# Patient Record
Sex: FEMALE | Race: WHITE
[De-identification: names, ages, dates, MRNs, and addresses within clinical notes are randomized per-mention and may not be internally consistent; named-entity substitution may affect disease eponyms.]

---

## 2017-01-26 ENCOUNTER — HOSPITAL ENCOUNTER (INPATIENT)
Dept: HOSPITAL 25 - ED | Age: 62
LOS: 4 days | Discharge: ANOTHER HOSPITAL | DRG: 720 | End: 2017-01-30
Attending: HOSPITALIST | Admitting: HOSPITALIST
Payer: COMMERCIAL

## 2017-01-26 DIAGNOSIS — Z79.899: ICD-10-CM

## 2017-01-26 DIAGNOSIS — D72.818: ICD-10-CM

## 2017-01-26 DIAGNOSIS — E87.6: ICD-10-CM

## 2017-01-26 DIAGNOSIS — Z94.4: ICD-10-CM

## 2017-01-26 DIAGNOSIS — K80.31: ICD-10-CM

## 2017-01-26 DIAGNOSIS — K74.3: ICD-10-CM

## 2017-01-26 DIAGNOSIS — A41.51: Primary | ICD-10-CM

## 2017-01-26 DIAGNOSIS — D69.59: ICD-10-CM

## 2017-01-26 LAB
ALBUMIN SERPL BCG-MCNC: 3.7 G/DL (ref 3.2–5.2)
ALP SERPL-CCNC: 93 U/L (ref 34–104)
ALT SERPL W P-5'-P-CCNC: 26 U/L (ref 7–52)
ANION GAP SERPL CALC-SCNC: 7 MMOL/L (ref 2–11)
AST SERPL-CCNC: 24 U/L (ref 13–39)
BUN SERPL-MCNC: 10 MG/DL (ref 6–24)
BUN/CREAT SERPL: 15.4 (ref 8–20)
CALCIUM SERPL-MCNC: 9.1 MG/DL (ref 8.6–10.3)
CHLORIDE SERPL-SCNC: 98 MMOL/L (ref 101–111)
GLOBULIN SER CALC-MCNC: 3 G/DL (ref 2–4)
GLUCOSE SERPL-MCNC: 127 MG/DL (ref 70–100)
HCO3 SERPL-SCNC: 25 MMOL/L (ref 22–32)
HCT VFR BLD AUTO: 37 % (ref 35–47)
HGB BLD-MCNC: 12.6 G/DL (ref 12–16)
LIPASE SERPL-CCNC: 19 U/L (ref 11–82)
MCH RBC QN AUTO: 30 PG (ref 27–31)
MCHC RBC AUTO-ENTMCNC: 34 G/DL (ref 31–36)
MCV RBC AUTO: 87 FL (ref 80–97)
POTASSIUM SERPL-SCNC: 3.5 MMOL/L (ref 3.5–5)
PROT SERPL-MCNC: 6.7 G/DL (ref 6.4–8.9)
RBC # BLD AUTO: 4.26 10^6/UL (ref 4–5.4)
SODIUM SERPL-SCNC: 130 MMOL/L (ref 133–145)
TROPONIN I SERPL-MCNC: 0.01 NG/ML (ref ?–0.04)
WBC # BLD AUTO: 4.8 10^3/UL (ref 3.5–10.8)

## 2017-01-26 PROCEDURE — 80053 COMPREHEN METABOLIC PANEL: CPT

## 2017-01-26 PROCEDURE — 81003 URINALYSIS AUTO W/O SCOPE: CPT

## 2017-01-26 PROCEDURE — 76376 3D RENDER W/INTRP POSTPROCES: CPT

## 2017-01-26 PROCEDURE — 85730 THROMBOPLASTIN TIME PARTIAL: CPT

## 2017-01-26 PROCEDURE — 86140 C-REACTIVE PROTEIN: CPT

## 2017-01-26 PROCEDURE — 87077 CULTURE AEROBIC IDENTIFY: CPT

## 2017-01-26 PROCEDURE — 87502 INFLUENZA DNA AMP PROBE: CPT

## 2017-01-26 PROCEDURE — 84484 ASSAY OF TROPONIN QUANT: CPT

## 2017-01-26 PROCEDURE — 85025 COMPLETE CBC W/AUTO DIFF WBC: CPT

## 2017-01-26 PROCEDURE — 87205 SMEAR GRAM STAIN: CPT

## 2017-01-26 PROCEDURE — 83605 ASSAY OF LACTIC ACID: CPT

## 2017-01-26 PROCEDURE — 36415 COLL VENOUS BLD VENIPUNCTURE: CPT

## 2017-01-26 PROCEDURE — 74181 MRI ABDOMEN W/O CONTRAST: CPT

## 2017-01-26 PROCEDURE — 74177 CT ABD & PELVIS W/CONTRAST: CPT

## 2017-01-26 PROCEDURE — 83690 ASSAY OF LIPASE: CPT

## 2017-01-26 PROCEDURE — 85610 PROTHROMBIN TIME: CPT

## 2017-01-26 PROCEDURE — 84145 PROCALCITONIN (PCT): CPT

## 2017-01-26 PROCEDURE — 71010: CPT

## 2017-01-26 PROCEDURE — 87040 BLOOD CULTURE FOR BACTERIA: CPT

## 2017-01-26 RX ADMIN — SODIUM CHLORIDE SCH MLS/HR: 900 IRRIGANT IRRIGATION at 14:43

## 2017-01-26 RX ADMIN — SODIUM CHLORIDE ONE MLS/HR: 900 IRRIGANT IRRIGATION at 11:00

## 2017-01-26 RX ADMIN — MYCOPHENOLIC ACID SCH MG: 360 TABLET, DELAYED RELEASE ORAL at 22:02

## 2017-01-26 RX ADMIN — ENOXAPARIN SODIUM SCH: 40 INJECTION SUBCUTANEOUS at 14:42

## 2017-01-26 RX ADMIN — TACROLIMUS SCH MG: 1 CAPSULE ORAL at 22:04

## 2017-01-26 RX ADMIN — SODIUM CHLORIDE ONE MLS/HR: 900 IRRIGANT IRRIGATION at 13:31

## 2017-01-26 NOTE — HP
ADMISSION HISTORY AND PHYSICAL:

 

DATE OF ADMISSION:  01/26/17

 

PRIMARY CARE PROVIDER:  Dr. Hank Bravo.

 

ADMITTING PROVIDER:  JENNIE Newton

 

SUPERVISING PHYSICIAN:  Dr. Elida Bowers. * (DICTATED BY JENNIE NEWTON)

 

CONSULTING INFECTIOUS DISEASE SPECIALIST:  Dr. Giorgi Phelps.

 

CHIEF COMPLAINT:  Fevers, dizziness, and weakness.

 

HISTORY OF PRESENT ILLNESS:  This is a 61-year-old female with a history of 
primary biliary cirrhosis, status post liver transplant approximately 6 years 
ago.  She is followed by a transplant specialist out of [place] and has 
really had no major complications since the transplant including major 
infections or hospitalizations.  The patient has been having intermittent 
fevers for the last week, first episode was approximately 1 week ago and was 
accompanied by some epigastric pain.  She said that her fever was as high as 
102 and she had severe chills and fatigue.  She was symptomatic for about 24 
hours and then her symptoms resolved spontaneously and she was asymptomatic for 
about 3 to 4 days.  She saw her transplant specialist for routine followup 
during that timeframe, who repeated routine labs and felt that she was doing 
well from a transplant perspective.  Then, again 2 days ago, she had recurrent 
symptoms, again fevers, chills, and she did have an episode of syncope 
associated with high fever.  The patient denies any associated nausea, vomiting
, or diarrhea.  She has not had any persistent abdominal pain.  No changes to 
her bowels.  She denies any cough or shortness of breath.  No rashes.  She 
believes she is urinating a bit more frequently than usual, but that is her 
only focal finding that she can think of.

 

The patient went to see her primary care provider yesterday with these 
complaints. She was febrile in his office with a temperature of about 101 
degrees Fahrenheit. Labs and blood cultures were taken at that time.  Blood 
cultures returned positive for gram-negative rods and the patient was contacted 
to proceed to the emergency department for evaluation.  The patient otherwise 
states that she is feeling quite well today and has been afebrile this morning.

 

PAST MEDICAL HISTORY:  Primary biliary cirrhosis, status post liver transplant 
approximately 6 years ago without significant complication.

 

PAST SURGICAL HISTORY:  Liver transplant.

 

HOME MEDICATIONS:

1.  Calcium carbonate supplementation 1 tablet p.o. twice daily.

2.  Mycophenolate 360 mg p.o. twice daily.

3.  Tacrolimus 2 mg p.o. twice daily.

4.  Ursodiol 500 mg in the morning and 250 mg in the evening.

 

SOCIAL HISTORY:  The patient lives at home with her .  No history of 
smoking or regular alcohol consumption.

 

REVIEW OF SYSTEMS:  As noted above in HPI.  She is actually feeling quite well 
at this point and her review of systems is unremarkable.

 

                               PHYSICAL EXAMINATION

 

GENERAL:  This is a very pleasant middle-aged female, in no acute distress.  
She is accompanied by her  and appears quite comfortable and smiles and 
jokes frequently.

 

VITAL SIGNS:  Temperature 97.8 degrees Fahrenheit, pulse 82 beats per minute, 
respiratory rate 20 per minute, oxygen saturation 100% on room air, and blood 
pressure 113/71 mmHg.

 

HEENT:  Head is normocephalic, atraumatic.  Mucous membranes are pink and moist.

 

RESPIRATORY:  Lungs are clear to auscultation without wheezes, crackles, or 
rhonchi.

 

CARDIOVASCULAR:  Heart has a regular rate and rhythm without murmurs, rubs, or 
gallops.

 

ABDOMEN:  Soft and nontender to palpation.

 

EXTREMITIES:  No lower extremity edema.

 

SKIN:  Limited exam shows no rashes or lesions.

 

DIAGNOSTIC STUDIES/LAB DATA:  CBC shows a white blood cell count of 4800 
yesterday; however, this was measured at 11,300; hemoglobin of 12.6 g/dL; a 
platelet count of 135,000 yesterday, this was normal at 159,000.  INR of 1.2.  
Comprehensive metabolic panel shows sodium of 130 mmol/L, which is similar to 
yesterday and a potassium of 3.5 mmol/L.  BUN of 10, creatinine 0.65.  Random 
glucose of 127 mg/dL.  Total bilirubin is 1.1, last lab available for reference 
from 2014 was 0.6. Transaminases are within normal limits including alk phos.  
Troponin negative.

 

Imaging:  Chest x-ray shows no acute process.

 

CT of the abdomen and pelvis is pending.

 

ASSESSMENT AND PLAN:  This is a 61-year-old female with history of primary 
biliary cirrhosis, status post liver transplant approximately 6 years ago, who 
presents with fevers and gram-negative bacteremia.

 

1.  Gram-negative bacteremia - the patient is asymptomatic at this time.  Final 
blood cultures are pending from yesterday and blood cultures have been repeated 
in the emergency department today.  The patient has no focal exam findings, 
only lab abnormality, has slightly elevated bilirubin.  Consider cholangitis or 
intermittent choledocholithiasis is potential etiology for her symptoms.  We 
plan to empirically treat with Zosyn.  CT scan of the abdomen and pelvis is 
pending at this time.  If negative, we will obtain ultrasound of the liver and 
consider MRCP depending on those results.  Dr. Giorgi Phelps has been asked 
to evaluate this patient and has seen her in the emergency department earlier 
today.  We plan to empirically treat with Zosyn at this time until the source 
of infection is more clear or final culture and sensitivities are available 
from blood cultures yesterday.

2.  History of primary biliary cirrhosis, status post liver transplant - we 
will continue per post-transplant medications at this time.

3.  Hyponatremia - it seemed that this might be due to hypovolemia and we will 
likely hydrate, but it appears that she is doing well at this time and is 
asymptomatic.

4.  Code status - the patient is a full code.

5.  DVT prophylaxis - the patient is at moderate risk for DVT and will be 
placed on _____ 40 mg subcu daily.

6.  Healthcare proxy is her .

 

DISPOSITION:  The patient is being admitted as an inpatient for gram-negative 
bacteremia with a history of liver transplant.  I anticipate length of stay to 
be greater than 2 midnights.

 

 ____________________________________ JENNIE NEWTON



CC:  Dr. Hank Bravo*

 

[id number]/[facility] #: [id number]

MAHENDRA

## 2017-01-26 NOTE — RAD
CLINICAL HISTORY: Abdominal pain, fever, history of liver transplant



COMPARISON: February 27, 2010



TECHNIQUE: Multiple contiguous axial CT scans were obtained of the abdomen and pelvis

after the administration of intravenous contrast. Coronal and sagittal multiplanar

reformations are submitted for review.  Oral contrast was administered.  Delayed images

were obtained through the abdomen



FINDINGS:    



LUNG BASES: The lung bases are clear.



LIVER: The liver is normal in shape, size, contour, and attenuation. There is postsurgical

change to the liver.

BILE DUCTS: There is intrahepatic and extrahepatic biliary dilatation. The common duct

measures up to 1.2 cm.

GALLBLADDER: The gallbladder is identified.



PANCREAS: The pancreas is normal, without mass or ductal dilatation.

SPLEEN: Normal in size and appearance.



UPPER GI TRACT: Evaluation of the gastrointestinal tract is limited by incomplete gastric

distention. The upper GI tract is unremarkable.

SMALL BOWEL AND MESENTERY: The small bowel is normal in contour, course, and caliber.

There is no obstruction or dilatation.

COLON: The colon is normal in contour, course, caliber. There is no pericolonic

inflammatory change.



ADRENALS: Normal bilaterally.

KIDNEYS: The kidneys are normal in shape, size, contour, and axis. There is no

hydronephrosis or nephrolithiasis.

BLADDER: The bladder is smooth in contour.



PELVIC ORGANS: The uterus is enlarged and lobulated consistent with a fibroid uterus.



AORTA: The aorta is normal.

IVC: Unremarkable



LYMPH NODES: There is no lymphadenopathy by size criteria.



ABDOMINAL WALL: There is a small fat-containing umbilical hernia.

BONES AND SOFT TISSUES: Chronic appearing compression deformities are noted of T12 and L3.

OTHER: None



IMPRESSION:

INTRAHEPATIC AND EXTRAHEPATIC BILIARY DILATATION.

FIBROID UTERUS

## 2017-01-26 NOTE — RAD
HISTORY: Fever, pneumonia



COMPARISONS: January 25, 2017



VIEWS:1: Single frontal portable view of the chest at 11:55 AM



FINDINGS:

LINES AND TUBES: None.

CARDIOMEDIASTINAL SILHOUETTE: The cardiomediastinal silhouette is normal for portable

technique.

PLEURA: The costophrenic angles are sharp. No pleural abnormalities are noted.

LUNG PARENCHYMA: The lungs are clear.

ABDOMEN: The upper abdomen is clear. There is no subphrenic gas.

BONES AND SOFT TISSUES: The patient is status post bilateral breast

augmentation/reconstruction



IMPRESSION: NO ACTIVE CARDIOPULMONARY DISEASE.

## 2017-01-26 NOTE — ED
Karel GAO Benjamin, scribed for Caesar Melo MD on 01/26/17 at 1119 .





Complex/Multi-Sys Presentation





- HPI Summary


HPI Summary: 





62yo female who has been having intermittent fever, shivering, and epigastric 

pain 1 week ago, and then again 2 days ago. Pt also fainted 2 days ago. Pt 

reports dull HA. Pt had fever yesterday as well but not today. Denies cough, or 

urinary symptoms. Pt has hx of liver transplant, which she follows up at 

[facility]. Pt had a recheck appointment last Friday. Pt 

also had blood culture drawn a few days ago that indicated positive for 

bacteria. 





- History Of Current Complaint


Chief Complaint: EDFever


Time Seen by Provider: 01/26/17 11:04


Hx Obtained From: Patient, Family/Caretaker - 


Onset/Duration: Gradual Onset, Lasting Days


Timing: Intermittent, Lasting:


Severity Currently: Moderate


Severity Initially: Moderate


Location: Pain At: - epigastric pain; HA


Associated Signs And Symptoms: Positive: Headache, Abdominal Pain - epigastric, 

Fever, Other - syncope





- Allergies/Home Medications


Allergies/Adverse Reactions: 


 Allergies











Allergy/AdvReac Type Severity Reaction Status Date / Time


 


No Known Allergies Allergy   Verified 03/30/13 12:59











Home Medications: 


 Home Medications





Calcium Carbonate-Cholecalcife [Calcium 1000 + D] 1 tab PO BID 01/26/17 [

History Confirmed 01/26/17]


Tacrolimus CAP(*) [Prograf CAP(*)] 2 mg PO BID 01/26/17 [History Confirmed 01/26 /17]


Ursodiol 250 mg PO QPM 01/26/17 [History Confirmed 01/26/17]


Ursodiol 500 mg PO QAM 01/26/17 [History Confirmed 01/26/17]











PMH/Surg Hx/FS Hx/Imm Hx


Respiratory History: 


   Denies: Hx Asthma, Hx Chronic Obstructive Pulmonary Disease (COPD)


Musculoskeletal History: 


   Denies: Hx Osteoporosis





- Surgical History


Surgery Procedure, Year, and Place: liver transplant


Infectious Disease History: No


Infectious Disease History: 


   Denies: Traveled Outside the [place] in Last 30 Days





- Family History


Known Family History: 


   Negative: Cardiac Disease, Hypertension, Diabetes





- Social History


Lives: With Family


Alcohol Use: None


Substance Use Type: Reports: None


Smoking Status (MU): Never Smoked Tobacco





Review of Systems


Positive: Fever


Eyes: Negative


ENT: Negative


Cardiovascular: Negative


Respiratory: Negative


Positive: Abdominal Pain - epigastric pain 


Genitourinary: Negative


Musculoskeletal: Negative


Skin: Negative


Positive: Headache, Syncope


Psychological: Normal


All Other Systems Reviewed And Are Negative: Yes





Physical Exam





- Summary


Physical Exam Summary: 








The patient is well-nourished in no acute distress and in no acute pain.





The skin is warm and dry and skin color reflects adequate perfusion. Decreased 

skin turgor. 





HEENT:  The head is normocephalic and atraumatic. The pupils are equal and 

reactive. The conjunctivae are clear and without drainage.  Nares are patent 

and without drainage.  Mouth reveals dry mucous membranes and the throat is 

without erythema and exudate.  The external ears are intact. The ear canals are 

patent and without drainage. The tympanic membranes are intact.





Neck is supple with full range of motion and non-tender. There are no carotid 

bruits.  There is no neck vein distension.





Respiratory: Chest is non-tender.  Lungs are clear to auscultation and breath 

sounds are symmetrical and equal.





Cardiovascular: Hear is regular rate and rhythm.  There is no murmur or rub 

auscultated.   There is no peripheral edema and pulses are symmetrical and 

equal.





Abdomen: The abdomen is soft and non-tender.  There are normal bowel sounds 

heard in all four quadrants and there is no organomegaly palpated.





Musculoskeletal: There is no back pain noted.  Extremities are non-tender with 

full range of motion.  There is good capillary refill.  There is no peripheral 

edema or calf tenderness elicited.





Neurological: Patient is alert and oriented to person, place and time.  The 

patient has symmetrical motor strength in all four extremities.  Cranial nerves 

are grossly intact. Deep tendon reflexes are symmetrical and equal in all four 

extremities.





Psychiatric: The patient has an appropriate affect and does not exhibit any 

anxiety or depression. 


Triage Information Reviewed: Yes


Vital Signs On Initial Exam: 


 Initial Vitals











Temp Pulse Resp BP Pulse Ox


 


 97.8 F   82   20   113/71   100 


 


 01/26/17 10:45  01/26/17 10:45  01/26/17 10:45  01/26/17 10:45  01/26/17 10:45











Vital Signs Reviewed: Yes





Diagnostics





- Vital Signs


 Vital Signs











  Temp Pulse Resp BP Pulse Ox


 


 01/26/17 10:45  97.8 F  82  20  113/71  100














- Laboratory


Lab Results: 


 Lab Results











  01/26/17 01/26/17 01/26/17 Range/Units





  11:20 11:20 11:20 


 


WBC  4.8    (3.5-10.8)  10^3/ul


 


RBC  4.26    (4.0-5.4)  10^6/ul


 


Hgb  12.6    (12.0-16.0)  g/dl


 


Hct  37    (35-47)  %


 


MCV  87    (80-97)  fL


 


MCH  30    (27-31)  pg


 


MCHC  34    (31-36)  g/dl


 


RDW  13    (10.5-15)  %


 


Plt Count  135 L    (150-450)  10^3/ul


 


MPV  8    (7.4-10.4)  um3


 


Neut % (Auto)  73.6    (38-83)  %


 


Lymph % (Auto)  17.2 L    (25-47)  %


 


Mono % (Auto)  8.9    (1-9)  %


 


Eos % (Auto)  0.2    (0-6)  %


 


Baso % (Auto)  0.1    (0-2)  %


 


Absolute Neuts (auto)  3.5    (1.5-7.7)  10^3/ul


 


Absolute Lymphs (auto)  0.8 L    (1.0-4.8)  10^3/ul


 


Absolute Monos (auto)  0.4    (0-0.8)  10^3/ul


 


Absolute Eos (auto)  0    (0-0.6)  10^3/ul


 


Absolute Basos (auto)  0    (0-0.2)  10^3/ul


 


Absolute Nucleated RBC  0    10^3/ul


 


Nucleated RBC %  0    


 


INR (Anticoag Therapy)   1.20 H   (0.89-1.11)  


 


APTT   27.4   (26.0-36.3)  seconds


 


Sodium    130 L  (133-145)  mmol/L


 


Potassium    3.5  (3.5-5.0)  mmol/L


 


Chloride    98 L  (101-111)  mmol/L


 


Carbon Dioxide    25  (22-32)  mmol/L


 


Anion Gap    7  (2-11)  mmol/L


 


BUN    10  (6-24)  mg/dL


 


Creatinine    0.65  (0.51-0.95)  mg/dL


 


Est GFR ( Amer)    119.6  (>60)  


 


Est GFR (Non-Af Amer)    93.0  (>60)  


 


BUN/Creatinine Ratio    15.4  (8-20)  


 


Glucose    127 H  ()  mg/dL


 


Lactic Acid     (0.5-2.0)  mmol/L


 


Calcium    9.1  (8.6-10.3)  mg/dL


 


Total Bilirubin    1.10 H  (0.2-1.0)  mg/dL


 


AST    24  (13-39)  U/L


 


ALT    26  (7-52)  U/L


 


Alkaline Phosphatase    93  ()  U/L


 


Troponin I    0.01  (<0.04)  ng/mL


 


C-Reactive Protein    99.78 H  (< 5.00)  mg/L


 


Total Protein    6.7  (6.4-8.9)  g/dL


 


Albumin    3.7  (3.2-5.2)  g/dL


 


Globulin    3.0  (2-4)  g/dL


 


Albumin/Globulin Ratio    1.2  (1-3)  


 


Lipase    19  (11.0-82.0)  U/L


 


Procalcitonin     (<0.6)  ng/mL














  01/26/17 01/26/17 Range/Units





  11:20 11:20 


 


WBC    (3.5-10.8)  10^3/ul


 


RBC    (4.0-5.4)  10^6/ul


 


Hgb    (12.0-16.0)  g/dl


 


Hct    (35-47)  %


 


MCV    (80-97)  fL


 


MCH    (27-31)  pg


 


MCHC    (31-36)  g/dl


 


RDW    (10.5-15)  %


 


Plt Count    (150-450)  10^3/ul


 


MPV    (7.4-10.4)  um3


 


Neut % (Auto)    (38-83)  %


 


Lymph % (Auto)    (25-47)  %


 


Mono % (Auto)    (1-9)  %


 


Eos % (Auto)    (0-6)  %


 


Baso % (Auto)    (0-2)  %


 


Absolute Neuts (auto)    (1.5-7.7)  10^3/ul


 


Absolute Lymphs (auto)    (1.0-4.8)  10^3/ul


 


Absolute Monos (auto)    (0-0.8)  10^3/ul


 


Absolute Eos (auto)    (0-0.6)  10^3/ul


 


Absolute Basos (auto)    (0-0.2)  10^3/ul


 


Absolute Nucleated RBC    10^3/ul


 


Nucleated RBC %    


 


INR (Anticoag Therapy)    (0.89-1.11)  


 


APTT    (26.0-36.3)  seconds


 


Sodium    (133-145)  mmol/L


 


Potassium    (3.5-5.0)  mmol/L


 


Chloride    (101-111)  mmol/L


 


Carbon Dioxide    (22-32)  mmol/L


 


Anion Gap    (2-11)  mmol/L


 


BUN    (6-24)  mg/dL


 


Creatinine    (0.51-0.95)  mg/dL


 


Est GFR ( Amer)    (>60)  


 


Est GFR (Non-Af Amer)    (>60)  


 


BUN/Creatinine Ratio    (8-20)  


 


Glucose    ()  mg/dL


 


Lactic Acid  0.8   (0.5-2.0)  mmol/L


 


Calcium    (8.6-10.3)  mg/dL


 


Total Bilirubin    (0.2-1.0)  mg/dL


 


AST    (13-39)  U/L


 


ALT    (7-52)  U/L


 


Alkaline Phosphatase    ()  U/L


 


Troponin I    (<0.04)  ng/mL


 


C-Reactive Protein    (< 5.00)  mg/L


 


Total Protein    (6.4-8.9)  g/dL


 


Albumin    (3.2-5.2)  g/dL


 


Globulin    (2-4)  g/dL


 


Albumin/Globulin Ratio    (1-3)  


 


Lipase    (11.0-82.0)  U/L


 


Procalcitonin   1.4 H  (<0.6)  ng/mL











Result Diagrams: 


 01/26/17 11:20





 01/26/17 11:20


Lab Statement: Any lab studies that have been ordered have been reviewed, and 

results considered in the medical decision making process.





- Radiology


  ** CXR


Xray Interpretation: No Acute Changes


Radiology Interpretation Completed By: Radiologist





- CT


  ** A/P W


CT Interpretation: No Acute Changes


CT Interpretation Completed By: Radiologist





Complex Multi-Symp Course/Dx


Course Of Treatment: Spoke to Dr. Rocha. Decided to admit the pt, and start 

her on Zosyn. Will put additional orders of CT A/P W and flu swab. Will speak 

to Dr. Bowers for admission.





- Diagnoses


Differential Diagnoses/HQI/PQRI: Sepsis, Urinary Tract Infection, Other - gram 

negative sepsis, liver abscess, influenza


Provider Diagnoses: 


 Bacteremia due to Gram-negative bacteria








- Physician Notifications


Discussed Care Of Patient With: Dr. Rocha @11:30.  Dr. Bowers @11:35.





Discharge





- Discharge Plan


Condition: Stable


Disposition: ADMITTED TO [facility]





The documentation as recorded by the Karel bazan Benjamin accurately reflects 

the service I personally performed and the decisions made by me, Caesar Melo MD.

## 2017-01-26 NOTE — CONS
CONSULTATION REPORT:

 

DATE OF CONSULT:  01/26/17

 

REQUESTING PHYSICIAN:  Dr. Melo.

 

CONSULTING SERVICE:  Infectious Disease.

 

REASON FOR CONSULT:  Fever, rigors.

 

IMPRESSION:

1.  Intermittent fever, rigors, and epigastric pain in a woman with a liver 
transplant 6 years ago.  She has crampy epigastric pain that comes on after 
eating and a gram-negative grace in the blood associated with fever and rigors.  
She has a slight elevation in bilirubin, concern for biliary tract abnormality 
including stenosis, partial obstruction or intermittent obstruction.  She has 
no other abdominal symptoms and no urinary symptoms including no dysuria or 
frequency that would suggest an alternative source for her gram-negative grace 
bacteremia.

2.  Status post liver transplant 6 years ago for primary biliary cirrhosis, on 
tacrolimus and mycophenolate.

3.  Leukocytosis.

4.  Thrombocytopenia.

 

RECOMMENDATION:  Zosyn 3.375 g IV every 8 hours by extended infusion.  Recheck 
blood cultures.  Check a CT of the abdomen and pelvis for biliary tract 
abnormality and to rule out liver abscess.  If they are negative or unrevealing 
of an underlying etiology, the next step would be an MRCP.

 

HISTORY OF PRESENT ILLNESS:  This is a 61-year-old woman with a history of 
liver transplant, admitted with fever, rigors, and abdominal pain.  She had 
been well until last week when she developed a sudden onset of epigastric and 
right upper quadrant crampy abdominal pain after eating dinner.  She does not 
remember what she had for dinner at that point.  Later that night, she had fever
, chills, rigors, and sweats.  By the next morning, she felt pretty much back 
to her usual self, and then it happened again 2 days ago after dinner.  She has 
had a decreased appetite over the last few days, however.  In any event, after 
dinner 2 nights ago, she had rigors and chills and had blood cultures ordered 
by Dr. Reid.  She was started on Augmentin.  She tolerated it well.  She 
is feeling a little bit better.  The blood cultures came back positive with gram
-negative rods.  So, she was referred to the ER where she is seen today.  Her 
white blood cell count was 11,000 on the 25th when checked by Dr. Reid and 
her bilirubin was 1.1, the LFTs were normal, CRP was 100.  This morning, she 
has no abdominal pain or fevers.  She otherwise feels back to her usual self.  
She has had no sick contacts, no myalgia except for during episodes of fever.  
She has tried some Tylenol, which helped.  Nothing seems to make the pain worse 
except for those that come on only after dinner.

 

PAST MEDICAL HISTORY:

1.  Status post liver transplant 6 years ago in [facility].

2.  Primary biliary cirrhosis.

 

MEDICATIONS:

1.  Ursodiol.

2.  Mycophenolate.

3.  Tacrolimus.

 

ALLERGIES:  No known drug allergies.

 

FAMILY HISTORY:  No recurrent infections.

 

SOCIAL HISTORY:  She lives in [place].  She is originally from [place].  She 
works in [employer].  She has no sick contacts.

 

REVIEW OF SYSTEMS:  All negative except as noted above.

 

PHYSICAL EXAM:  Vital Signs:  Temperature 36.6, heart rate 77, respiratory rate 
18, blood pressure 107/66, O2 sat 100% on room air.  In general, she is awake 
and in no acute distress.  Neurologically, she is oriented x3, follows all 
commands.  HEENT: Pupils are equal, round, and reactive to light without 
conjunctival hemorrhage. Oropharynx without lesions.  Neck:  Neck is supple 
without nuchal rigidity.  Lymph Nodes:  There is no cervical, supraclavicular, 
inguinal, axillary, or epitrochlear lymphadenopathy.  Heart has regular rate 
and rhythm without murmurs, rubs, or gallops.  Lungs are clear to auscultation 
bilaterally.  Abdomen:  Soft, nontender, nondistended without 
hepatosplenomegaly.  There is no right upper quadrant tenderness or Espinoza's 
sign.  Skin:  There is no rash or splinter hemorrhages. Musculoskeletal:  There 
is no spine tenderness to palpation or joint synovitis.

 

LABORATORY DATA:  Creatinine is 0.6.  Bilirubin 1.1, AST is 24, ALT 26, 
alkaline phosphatase 93.  CRP 99.  White blood cell count 11, platelets 135.

 

Please see impressions and recommendations as outlined above, which I have been 
discussed with Dr. Melo, Dr. Bowers, and JENNIE Lorenzo.

 

Thanks for asking me to see Ms. Engel in consultation.

 

 [id number]/[facility] #: [id number]

MAHENDRA

## 2017-01-27 LAB
ALBUMIN SERPL BCG-MCNC: 3.1 G/DL (ref 3.2–5.2)
ALP SERPL-CCNC: 72 U/L (ref 34–104)
ALT SERPL W P-5'-P-CCNC: 19 U/L (ref 7–52)
ANION GAP SERPL CALC-SCNC: 7 MMOL/L (ref 2–11)
AST SERPL-CCNC: 16 U/L (ref 13–39)
BUN SERPL-MCNC: 7 MG/DL (ref 6–24)
BUN/CREAT SERPL: 13 (ref 8–20)
CALCIUM SERPL-MCNC: 8 MG/DL (ref 8.6–10.3)
CHLORIDE SERPL-SCNC: 104 MMOL/L (ref 101–111)
GLOBULIN SER CALC-MCNC: 2.5 G/DL (ref 2–4)
GLUCOSE SERPL-MCNC: 94 MG/DL (ref 70–100)
HCO3 SERPL-SCNC: 23 MMOL/L (ref 22–32)
HCT VFR BLD AUTO: 32 % (ref 35–47)
HGB BLD-MCNC: 10.8 G/DL (ref 12–16)
MCH RBC QN AUTO: 30 PG (ref 27–31)
MCHC RBC AUTO-ENTMCNC: 34 G/DL (ref 31–36)
MCV RBC AUTO: 87 FL (ref 80–97)
POTASSIUM SERPL-SCNC: 3.4 MMOL/L (ref 3.5–5)
PROT SERPL-MCNC: 5.6 G/DL (ref 6.4–8.9)
RBC # BLD AUTO: 3.65 10^6/UL (ref 4–5.4)
SODIUM SERPL-SCNC: 134 MMOL/L (ref 133–145)
WBC # BLD AUTO: 3.6 10^3/UL (ref 3.5–10.8)

## 2017-01-27 RX ADMIN — ENOXAPARIN SODIUM SCH: 40 INJECTION SUBCUTANEOUS at 12:07

## 2017-01-27 RX ADMIN — SODIUM CHLORIDE SCH MLS/HR: 900 IRRIGANT IRRIGATION at 20:08

## 2017-01-27 RX ADMIN — SODIUM CHLORIDE SCH MLS/HR: 900 IRRIGANT IRRIGATION at 17:56

## 2017-01-27 RX ADMIN — MYCOPHENOLIC ACID SCH MG: 360 TABLET, DELAYED RELEASE ORAL at 20:06

## 2017-01-27 RX ADMIN — MYCOPHENOLIC ACID SCH MG: 360 TABLET, DELAYED RELEASE ORAL at 08:24

## 2017-01-27 RX ADMIN — URSODIOL SCH MG: 250 TABLET, FILM COATED ORAL at 17:55

## 2017-01-27 RX ADMIN — SODIUM CHLORIDE SCH MLS/HR: 900 IRRIGANT IRRIGATION at 07:01

## 2017-01-27 RX ADMIN — TACROLIMUS SCH MG: 1 CAPSULE ORAL at 20:06

## 2017-01-27 RX ADMIN — TACROLIMUS SCH MG: 1 CAPSULE ORAL at 08:24

## 2017-01-27 NOTE — PN
Progress Note





- Progress Note


SOAP: 


Subjective:


DOS: 1/27/17


CC: fever


HPI: 61 year old woman with liver transplant with intermittent fever and rigors 

after dinner 2-3 times over a week.  Outpt BC GNR, sent to ER, admitted 1/27/ 17.  CT AP showed intra and extrahepatic biliary duct dilation.  MRCP shows CBD 

filling defects.  No fever or abd pain overnight.  No rash or diarrhea.  Wants 

to leave.








Objective:


[]


 Vital Signs











Temp  36.6 C   01/27/17 11:19


 


Pulse  68   01/27/17 11:19


 


Resp  16   01/27/17 11:19


 


BP  110/58   01/27/17 11:19


 


Pulse Ox  100   01/27/17 11:19








 Intake & Output











 01/26/17 01/27/17 01/27/17





 18:59 06:59 18:59


 


Intake Total 1100 0 880


 


Balance 1100 0 880


 


Weight 119 lb 3.2 oz 119 lb 3.2 oz 


 


Intake:   


 


  IV Fluids 1100  


 


  Oral  0 880


 


Other:   


 


  Estimated Void   Medium


 


  # Bowel Movements   0


 


  # Voids  2 2








Gen:NAD, not diaphoretic


Neuro:AAOx3, answers all questions


Neck:Supple


Heart:RRR no murmur


Lungs:CTA BL


Abd:+BS NTND soft


Skin: no rash


LN: no visible or palpable LN


 











  01/26/17 01/26/17 01/26/17





  11:20 11:20 11:20


 


WBC  4.8  


 


RBC  4.26  


 


Hgb  12.6  


 


Hct  37  


 


MCV  87  


 


MCH  30  


 


MCHC  34  


 


RDW  13  


 


Plt Count  135 L  


 


MPV  8  


 


Neut % (Auto)  73.6  


 


Lymph % (Auto)  17.2 L  


 


Mono % (Auto)  8.9  


 


Eos % (Auto)  0.2  


 


Baso % (Auto)  0.1  


 


Absolute Neuts (auto)  3.5  


 


Absolute Lymphs (auto)  0.8 L  


 


Absolute Monos (auto)  0.4  


 


Absolute Eos (auto)  0  


 


Absolute Basos (auto)  0  


 


Absolute Nucleated RBC  0  


 


Nucleated RBC %  0  


 


INR (Anticoag Therapy)   1.20 H 


 


APTT   27.4 


 


Sodium    130 L


 


Potassium    3.5


 


Chloride    98 L


 


Carbon Dioxide    25


 


Anion Gap    7


 


BUN    10


 


Creatinine    0.65


 


Est GFR ( Amer)    119.6


 


Est GFR (Non-Af Amer)    93.0


 


BUN/Creatinine Ratio    15.4


 


Glucose    127 H


 


Lactic Acid   


 


Calcium    9.1


 


Total Bilirubin    1.10 H


 


AST    24


 


ALT    26


 


Alkaline Phosphatase    93


 


Troponin I    0.01


 


C-Reactive Protein    99.78 H


 


Total Protein    6.7


 


Albumin    3.7


 


Globulin    3.0


 


Albumin/Globulin Ratio    1.2


 


Lipase    19


 


Procalcitonin   


 


Urine Color   


 


Urine Appearance   


 


Urine pH   


 


Ur Specific Gravity   


 


Urine Protein   


 


Urine Ketones   


 


Urine Blood   


 


Urine Nitrate   


 


Urine Bilirubin   


 


Urine Urobilinogen   


 


Ur Leukocyte Esterase   


 


Urine Glucose   


 


Influenza A (Rapid)   


 


Influenza B (Rapid)   














  01/26/17 01/26/17 01/26/17





  11:20 11:20 12:55


 


WBC   


 


RBC   


 


Hgb   


 


Hct   


 


MCV   


 


MCH   


 


MCHC   


 


RDW   


 


Plt Count   


 


MPV   


 


Neut % (Auto)   


 


Lymph % (Auto)   


 


Mono % (Auto)   


 


Eos % (Auto)   


 


Baso % (Auto)   


 


Absolute Neuts (auto)   


 


Absolute Lymphs (auto)   


 


Absolute Monos (auto)   


 


Absolute Eos (auto)   


 


Absolute Basos (auto)   


 


Absolute Nucleated RBC   


 


Nucleated RBC %   


 


INR (Anticoag Therapy)   


 


APTT   


 


Sodium   


 


Potassium   


 


Chloride   


 


Carbon Dioxide   


 


Anion Gap   


 


BUN   


 


Creatinine   


 


Est GFR ( Amer)   


 


Est GFR (Non-Af Amer)   


 


BUN/Creatinine Ratio   


 


Glucose   


 


Lactic Acid  0.8  


 


Calcium   


 


Total Bilirubin   


 


AST   


 


ALT   


 


Alkaline Phosphatase   


 


Troponin I   


 


C-Reactive Protein   


 


Total Protein   


 


Albumin   


 


Globulin   


 


Albumin/Globulin Ratio   


 


Lipase   


 


Procalcitonin   1.4 H 


 


Urine Color   


 


Urine Appearance   


 


Urine pH   


 


Ur Specific Gravity   


 


Urine Protein   


 


Urine Ketones   


 


Urine Blood   


 


Urine Nitrate   


 


Urine Bilirubin   


 


Urine Urobilinogen   


 


Ur Leukocyte Esterase   


 


Urine Glucose   


 


Influenza A (Rapid)    Negative


 


Influenza B (Rapid)    Negative














  01/26/17 01/26/17 01/27/17





  13:12 15:06 05:29


 


WBC    3.6


 


RBC    3.65 L


 


Hgb    10.8 L


 


Hct    32 L


 


MCV    87


 


MCH    30


 


MCHC    34


 


RDW    13


 


Plt Count    114 L


 


MPV    8


 


Neut % (Auto)    63.7


 


Lymph % (Auto)    24.3 L


 


Mono % (Auto)    10.7 H


 


Eos % (Auto)    0.7


 


Baso % (Auto)    0.6


 


Absolute Neuts (auto)    2.3


 


Absolute Lymphs (auto)    0.9 L


 


Absolute Monos (auto)    0.4


 


Absolute Eos (auto)    0


 


Absolute Basos (auto)    0


 


Absolute Nucleated RBC    0


 


Nucleated RBC %    0.1


 


INR (Anticoag Therapy)   


 


APTT   


 


Sodium   


 


Potassium   


 


Chloride   


 


Carbon Dioxide   


 


Anion Gap   


 


BUN   


 


Creatinine   


 


Est GFR ( Amer)   


 


Est GFR (Non-Af Amer)   


 


BUN/Creatinine Ratio   


 


Glucose   


 


Lactic Acid   0.5 


 


Calcium   


 


Total Bilirubin   


 


AST   


 


ALT   


 


Alkaline Phosphatase   


 


Troponin I   


 


C-Reactive Protein   


 


Total Protein   


 


Albumin   


 


Globulin   


 


Albumin/Globulin Ratio   


 


Lipase   


 


Procalcitonin   


 


Urine Color  Yellow  


 


Urine Appearance  Clear  


 


Urine pH  5.0  


 


Ur Specific Gravity  1.025  


 


Urine Protein  Negative  


 


Urine Ketones  Trace H  


 


Urine Blood  Negative  


 


Urine Nitrate  Negative  


 


Urine Bilirubin  Negative  


 


Urine Urobilinogen  Negative  


 


Ur Leukocyte Esterase  Negative  


 


Urine Glucose  Negative  


 


Influenza A (Rapid)   


 


Influenza B (Rapid)   














  01/27/17





  05:29


 


WBC 


 


RBC 


 


Hgb 


 


Hct 


 


MCV 


 


MCH 


 


MCHC 


 


RDW 


 


Plt Count 


 


MPV 


 


Neut % (Auto) 


 


Lymph % (Auto) 


 


Mono % (Auto) 


 


Eos % (Auto) 


 


Baso % (Auto) 


 


Absolute Neuts (auto) 


 


Absolute Lymphs (auto) 


 


Absolute Monos (auto) 


 


Absolute Eos (auto) 


 


Absolute Basos (auto) 


 


Absolute Nucleated RBC 


 


Nucleated RBC % 


 


INR (Anticoag Therapy) 


 


APTT 


 


Sodium  134


 


Potassium  3.4 L


 


Chloride  104


 


Carbon Dioxide  23


 


Anion Gap  7


 


BUN  7


 


Creatinine  0.54


 


Est GFR ( Amer)  148.1


 


Est GFR (Non-Af Amer)  115.2


 


BUN/Creatinine Ratio  13.0


 


Glucose  94


 


Lactic Acid 


 


Calcium  8.0 L


 


Total Bilirubin  0.70


 


AST  16


 


ALT  19


 


Alkaline Phosphatase  72


 


Troponin I 


 


C-Reactive Protein 


 


Total Protein  5.6 L


 


Albumin  3.1 L


 


Globulin  2.5


 


Albumin/Globulin Ratio  1.2


 


Lipase 


 


Procalcitonin 


 


Urine Color 


 


Urine Appearance 


 


Urine pH 


 


Ur Specific Gravity 


 


Urine Protein 


 


Urine Ketones 


 


Urine Blood 


 


Urine Nitrate 


 


Urine Bilirubin 


 


Urine Urobilinogen 


 


Ur Leukocyte Esterase 


 


Urine Glucose 


 


Influenza A (Rapid) 


 


Influenza B (Rapid) 








Microbiology





01/26/17 12:05   Blood Venous   Aerobic Blood Culture - Final


01/26/17 12:05   Blood Venous   Blood Culture - Final


                              Escherichia Coli


                              Escherichia Coli


01/25/17 14:58   Blood Venous   Aerobic Blood Culture - Final


01/25/17 14:58   Blood Venous   Blood Culture - Final


                              Escherichia Coli


                              Escherichia Coli











Assessment:


1. E.coli bacteremia and sx intermittent biliary colic, likely CBD stone on 

MRCP.  Bacteremia persists. 


2. Liver transplant on tacro and cellcept


3. thrombocytopenia








Plan:


1. change abx to 1 gm IV Q24hrs, awaiting decision from her transplant Drs 

regarding next steps to get an ERCP done.  High risk for bad outcome given 

organ transplant and immunosuppressive medications, I told the patient she 

needs to stay on IV antibiotics while the transfer process is happening.  


Discussed with Dr Gamez and Dr Vasquez

## 2017-01-27 NOTE — RAD
Indication: Common duct calculi.  Patient has history of liver transplant.



Image Sequences:  Coronal T2, heavily T2-weighted coronal, axial T2 fat sat images were

obtained. Multiple maximum intensity projection images were obtained.



The patient reports a liver transplant. It is presumed to be an orthotopic liver

transplant as there appears to be a left and right portal vein noted. There are dilated

intrahepatic ducts noted. There is a web or stricture in the common bile duct which is

presumably at the site of the anastomosis between the transplanted liver's common bile

duct and the native common bile duct. Correlation with surgical history is suggested. No

old films are available for comparison. There are, however, tiny filling defects just

proximal to the dilated common duct proximal to the stricture which may represent sludge

or small common duct stones. There are no filling defects noted in the distal duct

suspicious for distal common duct stone.



IMPRESSION: There is presumed orthotopic liver transplant. There is a stricture in the

common bile duct which is presumed to be at the anastomosis between common duct of the

transplanted liver and the native common bile duct. There are filling defects proximal to

the stricture. These are likely common duct stones.

## 2017-01-27 NOTE — PN
Subjective


Date of Service: 01/27/17


Interval History: 


HOSPITALIST PROGRESS NOTE


Patient seen and examined at bedside.


She feels better today. No further episodes of fever or abdominal pain.


Family History: Unchanged from Admission


Social History: Unchanged from Admission


Past Medical History: Unchanged from Admission





Objective


Active Medications: 








Acetaminophen (Tylenol Tab*)  650 mg PO Q4H PRN


   PRN Reason: FEVER/PAIN


Enoxaparin Sodium (Lovenox(*))  40 mg SUBCUT Q24H [facility]


   Last Admin: 01/27/17 12:07 Dose:  Not Given


Piperacillin Sod/Tazobactam Sod (Zosyn 3.375 Gm In Ns Premix*)  3.375 gm in 100 

mls @ 25 mls/hr IVPB Q8H [facility]


   Stop: 01/28/17 06:00


   Last Admin: 01/27/17 16:40 Dose:  25 mls/hr


Ceftriaxone Sodium 1,000 mg/ (Sodium Chloride)  50 mls @ 200 mls/hr IVPB Q24H 

[facility]


Sodium Chloride (Ns 0.9% 1000 Ml*)  1,000 mls @ 100 mls/hr IV PER RATE [facility]


Iohexol (Omnipaque 300* (Contrast))  69 ml IV ONCE [facility]


   Stop: 01/28/17 23:59


   Last Admin: 01/26/17 13:52 Dose:  69 ml


Morphine Sulfate (Morphine Inj (Syringe)*)  2 mg IV Q4H PRN


   PRN Reason: PAIN


Mycophenolate Sodium (Myfortic (Nf))  360 mg PO BID [facility]


   Last Admin: 01/27/17 08:24 Dose:  360 mg


Tacrolimus (Prograf Cap(*))  2 mg PO BID [facility]


Ursodiol (Yonathan 250(Nf))  250 mg PO QPM [facility]


Ursodiol (Yonathan 250(Nf))  500 mg PO QAM [facility]








 Vital Signs











  01/27/17 01/27/17 01/27/17





  07:40 08:00 11:19


 


Temperature 97.2 F  97.8 F


 


Pulse Rate 72  68


 


Respiratory 16 16 16





Rate   


 


Blood Pressure 123/67  110/58





(mmHg)   


 


O2 Sat by Pulse 98 100 100





Oximetry   











Oxygen Devices in Use Now: None


Appearance: Pleasant lady sitting up in bed in NAD.


Eyes: No Scleral Icterus


Ears/Nose/Mouth/Throat: Mucous Membranes Moist


Neck: Trachea Midline


Respiratory: Symmetrical Chest Expansion and Respiratory Effort, Clear to 

Auscultation


Cardiovascular: NL Sounds; No Murmurs; No JVD, RRR


Abdominal: NL Sounds; No Tenderness; No Distention


Extremities: No Edema


Neurological: Alert and Oriented x 3, NL Muscle Strength and Tone


Lines/Tubes/Other Access: Clean, Dry and Intact Peripheral IV


Nutrition: Taking PO's


Result Diagrams: 


 01/27/17 05:29





 01/27/17 05:29





Assess/Plan/Problems-Billing


Assessment: 


Mrs. Engel is a 60yo F with PMH of PBC s/p liver transplant who presented 

with E. coli septicemia secondary to cholangitis.





- Patient Problems


(1) E. coli septicemia


Comment: - Source is likely cholangitis.


- Blood cultures still positive.


- ID input appreciated.


- Continue IVF and Ceftriaxone.   





(2) Cholangitis


Comment: - MRCP reviewed - shows CBD stenosis and gallstones. Likely source of 

her infection.


- [facility] patient accepted 

in transfer for further evaluation, possible ERCP and stent placement.


- Awaiting bed availability.   





(3) DVT prophylaxis


Comment: - Lovenox.

## 2017-01-28 LAB
ADD DIFF/SLIDE REVIEW?: (no result)
ALBUMIN SERPL BCG-MCNC: 3.2 G/DL (ref 3.2–5.2)
ALP SERPL-CCNC: 71 U/L (ref 34–104)
ALT SERPL W P-5'-P-CCNC: 17 U/L (ref 7–52)
ANION GAP SERPL CALC-SCNC: 7 MMOL/L (ref 2–11)
AST SERPL-CCNC: 16 U/L (ref 13–39)
BUN SERPL-MCNC: 5 MG/DL (ref 6–24)
BUN/CREAT SERPL: 10.9 (ref 8–20)
CALCIUM SERPL-MCNC: 8.4 MG/DL (ref 8.6–10.3)
CHLORIDE SERPL-SCNC: 106 MMOL/L (ref 101–111)
GLOBULIN SER CALC-MCNC: 2.7 G/DL (ref 2–4)
GLUCOSE SERPL-MCNC: 92 MG/DL (ref 70–100)
HCO3 SERPL-SCNC: 21 MMOL/L (ref 22–32)
HCT VFR BLD AUTO: 33 % (ref 35–47)
HGB BLD-MCNC: 11.1 G/DL (ref 12–16)
MCH RBC QN AUTO: 30 PG (ref 27–31)
MCHC RBC AUTO-ENTMCNC: 34 G/DL (ref 31–36)
MCV RBC AUTO: 87 FL (ref 80–97)
POTASSIUM SERPL-SCNC: 3.8 MMOL/L (ref 3.5–5)
PROT SERPL-MCNC: 5.9 G/DL (ref 6.4–8.9)
RBC # BLD AUTO: 3.76 10^6/UL (ref 4–5.4)
SODIUM SERPL-SCNC: 134 MMOL/L (ref 133–145)
WBC # BLD AUTO: 2.8 10^3/UL (ref 3.5–10.8)

## 2017-01-28 RX ADMIN — ENOXAPARIN SODIUM SCH: 40 INJECTION SUBCUTANEOUS at 13:31

## 2017-01-28 RX ADMIN — MYCOPHENOLIC ACID SCH MG: 360 TABLET, DELAYED RELEASE ORAL at 20:50

## 2017-01-28 RX ADMIN — CEFTRIAXONE SODIUM SCH MLS/HR: 1 INJECTION, POWDER, FOR SOLUTION INTRAMUSCULAR; INTRAVENOUS at 08:16

## 2017-01-28 RX ADMIN — URSODIOL SCH MG: 250 TABLET, FILM COATED ORAL at 17:26

## 2017-01-28 RX ADMIN — URSODIOL SCH MG: 250 TABLET, FILM COATED ORAL at 08:22

## 2017-01-28 RX ADMIN — TACROLIMUS SCH MG: 1 CAPSULE ORAL at 08:20

## 2017-01-28 RX ADMIN — TACROLIMUS SCH MG: 1 CAPSULE ORAL at 20:50

## 2017-01-28 RX ADMIN — MYCOPHENOLIC ACID SCH MG: 360 TABLET, DELAYED RELEASE ORAL at 08:20

## 2017-01-28 NOTE — PN
Subjective


Date of Service: 01/28/17


Interval History: 


HOSPITALIST PROGRESS NOTE


Patient seen and examined at bedside.


She feels well today. Denies abdominal pain, very anxious about transfer.


Family History: Unchanged from Admission


Social History: Unchanged from Admission


Past Medical History: Unchanged from Admission





Objective


Active Medications: 








Acetaminophen (Tylenol Tab*)  650 mg PO Q4H PRN


   PRN Reason: FEVER/PAIN


   Last Admin: 01/28/17 15:22 Dose:  650 mg


Enoxaparin Sodium (Lovenox(*))  40 mg SUBCUT Q24H [facility]


   Last Admin: 01/28/17 13:31 Dose:  Not Given


Ceftriaxone Sodium 1,000 mg/ (Sodium Chloride)  50 mls @ 200 mls/hr IVPB Q24H 

[facility]


   Last Admin: 01/28/17 08:16 Dose:  200 mls/hr


Sodium Chloride (Ns 0.9% 1000 Ml*)  1,000 mls @ 100 mls/hr IV PER RATE [facility]


   Last Admin: 01/27/17 20:08 Dose:  100 mls/hr


Iohexol (Omnipaque 300* (Contrast))  69 ml IV ONCE [facility]


   Stop: 01/28/17 23:59


   Last Admin: 01/26/17 13:52 Dose:  69 ml


Morphine Sulfate (Morphine Inj (Syringe)*)  2 mg IV Q4H PRN


   PRN Reason: PAIN


Mycophenolate Sodium (Myfortic (Nf))  360 mg PO BID [facility]


   Last Admin: 01/28/17 08:20 Dose:  360 mg


Tacrolimus (Prograf Cap(*))  2 mg PO BID [facility]


   Last Admin: 01/28/17 08:20 Dose:  2 mg


Ursodiol (Yonathan 250(Nf))  250 mg PO QPM [facility]


   Last Admin: 01/27/17 17:55 Dose:  250 mg


Ursodiol (Yonathan 250(Nf))  500 mg PO QAM [facility]


   Last Admin: 01/28/17 08:22 Dose:  500 mg








 Vital Signs











  01/27/17 01/27/17 01/27/17





  19:57 20:00 23:39


 


Temperature 98.0 F  98.0 F


 


Pulse Rate 73  67


 


Respiratory 16 16 14





Rate   


 


Blood Pressure 99/67  119/67





(mmHg)   


 


O2 Sat by Pulse 98 98 98





Oximetry   














  01/28/17 01/28/17 01/28/17





  07:33 08:00 15:35


 


Temperature 97.6 F  


 


Pulse Rate 67  74


 


Respiratory 16 16 16





Rate   


 


Blood Pressure 125/72  128/79





(mmHg)   


 


O2 Sat by Pulse 100 100 100





Oximetry   











Oxygen Devices in Use Now: None


Appearance: Pleasant lady sitting up in bed in NAD.


Eyes: No Scleral Icterus


Ears/Nose/Mouth/Throat: Mucous Membranes Moist


Neck: Trachea Midline


Respiratory: Symmetrical Chest Expansion and Respiratory Effort, Clear to 

Auscultation


Cardiovascular: RRR - Normal S1 and S2


Abdominal: - - Soft, mild epigastric and RUQ tenderness, NG, NR, BS+


Extremities: No Edema


Neurological: Alert and Oriented x 3, NL Muscle Strength and Tone


Lines/Tubes/Other Access: Clean, Dry and Intact Peripheral IV


Nutrition: Taking PO's


Result Diagrams: 


 01/28/17 07:43





 01/28/17 07:43





Assess/Plan/Problems-Billing


Assessment: 


Mrs. Engel is a 60yo F with PMH of PBC s/p liver transplant who presented 

with E. coli septicemia secondary to cholangitis.





- Patient Problems


(1) E. coli septicemia


Comment: - Source is likely cholangitis.


- Blood cultures still positive.


- ID input appreciated.


- Continue IVF and Ceftriaxone.   





(2) Cholangitis


Comment: - MRCP reviewed - shows CBD stenosis and gallstones. Likely source of 

her infection.


- Case d/w Dr. Vasquez who d/w [facility] Hepatologist - patient accepted 

in transfer for further evaluation, possible ERCP and stent placement.


- Awaiting bed availability.   





(3) DVT prophylaxis


Comment: - Lovenox.   


Status and Disposition: 


Inpatient for management of E. coli septicemia. Awaiting bed at [facility].

## 2017-01-28 NOTE — DS
DISCHARGE SUMMARY:

 

DATE OF ADMISSION:  01/26/17

 

DATE OF TRANSFER:  01/27/17

 

PRIMARY CARE PROVIDER:  Dr. Hank Bravo.

 

HEPATOLOGIST:  Dr. Weber.

 

ACCEPTING PHYSICIAN:  Dr. Alee Geronimo.

 

DISCHARGE DIAGNOSES:

1.  Escherichia coli septicemia.

2.  Cholangitis.

3.  Choledocholithiasis.

4.  Common bile duct stenosis.

5.  Mild hypokalemia.

 

SECONDARY DIAGNOSES:  Primary biliary cirrhosis, status post liver transplant 6 
years ago at [facility].

 

MEDICATION LIST AT THE TIME OF TRANSFER:

1.  Acetaminophen 650 mg p.o. q.4 hours p.r.n. pain or fever.

2.  Ceftriaxone 1 g IV daily.

3.  Lovenox 40 mg subcutaneously daily.

4.  Morphine 2 mg IV q.4 hours p.r.n. severe pain.

5.  Mycophenolate 360 mg p.o. b.i.d.

6.  Normal saline 100 mL an hour.

7.  Tacrolimus 2 mg p.o. b.i.d.

8.  Ursodiol 500 mg p.o. q.a.m. and 250 mg p.o. q.p.m.

 

HOSPITAL COURSE:  Ms. Engel is a 61-year-old lady with a past medical history 
as stated above that presented to the emergency room after being referred from 
her PCP's office with fever, chills, and weakness.  The patient states that 1 
week ago, she had one episode of high fever followed by severe shaking chills. 
She was seen the day after by Dr. Weber on her annual appointment and was told 
that her LFTs were normal and she probably had a viral infection.  She drove 
back to [place] and states that she was feeling well during the weekend, but 
then had another episode of high fever measured at 103 associated with shaking 
chills and fatigue.  This once again resolved and the next day, she felt in her 
normal state of health, was able to shovel snow in her driveway, and had no 
other complaints.  Of note is that both times she had fever, she also had some 
right upper quadrant pain that resolved in a couple of hours with a warm pack.  
She avoids taking pain medication.

 

Two days ago, she had recurrent symptoms and at this time, with the fever, she 
also had a syncopal episode.  She was seen at her PCP's office and had blood 
test done and yesterday, she was called that her blood cultures were positive 
and she should come to the emergency room for further evaluation.

 

Her blood cultures were growing gram-negative bacilli that have now been 
speciated as E. coli, but sensitivities are pending at the time of this 
dictation.

 

The patient had a chest x-ray that showed no active cardiopulmonary disease.  A 
CT of the abdomen and pelvis that showed intrahepatic and extrahepatic biliary 
dilatation and fibroid uterus.  An MRCP showed presumed orthotopic liver 
transplant.  There is a stricture in the common bile duct, which is presumed to 
be at the anastomosis between common duct of the transplanted liver and the 
native common bile duct.  There are filling defects proximal to this stricture.
  These are likely common duct stones.

 

The impression is that the patient most likely has E. coli septicemia 
associated with cholangitis secondary to a common bile duct stricture.  She was 
seen in consultation by Infectious Disease (Dr. Phelps) and his 
recommendation was to continue IV antibiotics (ceftriaxone) until arrangements 
are made for her transfer and ERCP.  His recommendation was to continue IV 
antibiotics as the patient is immunosuppressed with her transplant medications.

 

The patient was also seen by Gastroenterology (Dr. Vasquez) and he contacted 
the hepatologist at [facility] and arrangements are being made for 
her transfer.  The initial idea is that the patient will need an ERCP and 
possible stent placement.  Although we do perform ERCPs in our facility, it is 
felt that with her liver transplant, this is a very complex case and should be 
managed on a tertiary center with liver transplant capabilities and we believe 
that it is in the best interest for the patient that she goes to the place 
where she had transplant done as they are familiar with her history and anatomy.

 

The patient is medically stable for transfer at this time.

 

PHYSICAL EXAMINATION:  Vital Signs:  Temperature 97.8, heart rate is 68, 
respiratory rate is 16, oxygen saturation is 100% on room air, blood pressure 
is 110/58.  General:  The patient is a pleasant lady, sitting up in bed, in no 
acute distress.  CVS:  Normal S1, S2.  Regular rate and rhythm.  Chest:  Breath 
sounds present bilaterally with no added sounds.  Abdomen:  The patient has a 
large upper abdomen surgical scar but no tenderness on palpation, no guarding, 
no rebound. Bowel sounds are present.  Extremities:  No edema.  Neuro:  She is 
alert, awake, and oriented x3.  Able to move all 4 extremities.

 

DIET:  Low-fat diet.

 

ACTIVITY:  As tolerated.

 

DISPOSITION:  To [facility].

 

STATUS WHILE IN THE HOSPITAL:  Inpatient.

 

Please keep in mind this is a summarized version of this patient's hospital 
stay. If you need more information, please feel free to call me at [phone] 
or please obtain the full medical records.

 

TIME SPENT:  Approximately 55 minutes was spent to complete this discharge.



CC:  Dr. Hank Bravo; *

CC:  Dr. Weber, [facility]; *

CC:  Dr. Alee Geronimo, Surgical Transplant Hepatologist, [facility] *

 

 [id number]/CPS #: [id number]

MTDD

## 2017-01-29 LAB
ALBUMIN SERPL BCG-MCNC: 3.8 G/DL (ref 3.2–5.2)
ALP SERPL-CCNC: 87 U/L (ref 34–104)
ALT SERPL W P-5'-P-CCNC: 18 U/L (ref 7–52)
ANION GAP SERPL CALC-SCNC: 7 MMOL/L (ref 2–11)
AST SERPL-CCNC: 16 U/L (ref 13–39)
BUN SERPL-MCNC: 6 MG/DL (ref 6–24)
BUN/CREAT SERPL: 11.3 (ref 8–20)
CALCIUM SERPL-MCNC: 9.1 MG/DL (ref 8.6–10.3)
CHLORIDE SERPL-SCNC: 103 MMOL/L (ref 101–111)
GLOBULIN SER CALC-MCNC: 3.2 G/DL (ref 2–4)
GLUCOSE SERPL-MCNC: 91 MG/DL (ref 70–100)
HCO3 SERPL-SCNC: 26 MMOL/L (ref 22–32)
HCT VFR BLD AUTO: 38 % (ref 35–47)
HGB BLD-MCNC: 12.8 G/DL (ref 12–16)
MCH RBC QN AUTO: 29 PG (ref 27–31)
MCHC RBC AUTO-ENTMCNC: 34 G/DL (ref 31–36)
MCV RBC AUTO: 87 FL (ref 80–97)
POTASSIUM SERPL-SCNC: 3.6 MMOL/L (ref 3.5–5)
PROT SERPL-MCNC: 7 G/DL (ref 6.4–8.9)
RBC # BLD AUTO: 4.34 10^6/UL (ref 4–5.4)
SODIUM SERPL-SCNC: 136 MMOL/L (ref 133–145)
WBC # BLD AUTO: 3 10^3/UL (ref 3.5–10.8)

## 2017-01-29 RX ADMIN — TACROLIMUS SCH MG: 1 CAPSULE ORAL at 09:33

## 2017-01-29 RX ADMIN — MYCOPHENOLIC ACID SCH MG: 360 TABLET, DELAYED RELEASE ORAL at 09:33

## 2017-01-29 RX ADMIN — TACROLIMUS SCH MG: 1 CAPSULE ORAL at 21:40

## 2017-01-29 RX ADMIN — SODIUM CHLORIDE SCH MLS/HR: 900 IRRIGANT IRRIGATION at 02:49

## 2017-01-29 RX ADMIN — CEFTRIAXONE SODIUM SCH MLS/HR: 1 INJECTION, POWDER, FOR SOLUTION INTRAMUSCULAR; INTRAVENOUS at 08:07

## 2017-01-29 RX ADMIN — URSODIOL SCH MG: 250 TABLET, FILM COATED ORAL at 09:34

## 2017-01-29 RX ADMIN — MYCOPHENOLIC ACID SCH MG: 360 TABLET, DELAYED RELEASE ORAL at 21:40

## 2017-01-29 RX ADMIN — ENOXAPARIN SODIUM SCH: 40 INJECTION SUBCUTANEOUS at 13:30

## 2017-01-29 RX ADMIN — URSODIOL SCH MG: 250 TABLET, FILM COATED ORAL at 17:38

## 2017-01-29 RX ADMIN — SODIUM CHLORIDE SCH MLS/HR: 900 IRRIGANT IRRIGATION at 09:32

## 2017-01-29 NOTE — PN
Subjective


Date of Service: 01/29/17


Interval History: 


HOSPITALIST PROGRESS NOTE


Patient seen and examined at bedside.


Feels well today, denies pain, tolerating diet well. Anxious about transfer, 

but no bed available yet.


Family History: Unchanged from Admission


Social History: Unchanged from Admission


Past Medical History: Unchanged from Admission





Objective


Active Medications: 








Acetaminophen (Tylenol Tab*)  650 mg PO Q4H PRN


   PRN Reason: FEVER/PAIN


   Last Admin: 01/28/17 15:22 Dose:  650 mg


Enoxaparin Sodium (Lovenox(*))  40 mg SUBCUT Q24H [facility]


   Last Admin: 01/29/17 13:30 Dose:  Not Given


Ceftriaxone Sodium 1,000 mg/ (Sodium Chloride)  50 mls @ 200 mls/hr IVPB Q24H 

[facility]


   Last Admin: 01/29/17 08:07 Dose:  200 mls/hr


Morphine Sulfate (Morphine Inj (Syringe)*)  2 mg IV Q4H PRN


   PRN Reason: PAIN


Mycophenolate Sodium (Myfortic (Nf))  360 mg PO BID [facility]


   Last Admin: 01/29/17 09:33 Dose:  360 mg


Tacrolimus (Prograf Cap(*))  2 mg PO BID [facility]


   Last Admin: 01/29/17 09:33 Dose:  2 mg


Ursodiol (Yonathan 250(Nf))  250 mg PO QPM [facility]


   Last Admin: 01/28/17 17:26 Dose:  250 mg


Ursodiol (Yonathan 250(Nf))  500 mg PO QAM [facility]


   Last Admin: 01/29/17 09:34 Dose:  500 mg








 Vital Signs











  01/28/17 01/28/17 01/28/17





  15:35 20:00 23:40


 


Temperature   98.0 F


 


Pulse Rate 74  66


 


Respiratory 16 18 18





Rate   


 


Blood Pressure 128/79  122/64





(mmHg)   


 


O2 Sat by Pulse 100 99 99





Oximetry   











Oxygen Devices in Use Now: None


Appearance: Pleasant lady sitting up in bed in NAD.


Eyes: No Scleral Icterus


Ears/Nose/Mouth/Throat: Mucous Membranes Moist


Neck: Trachea Midline


Extremities: No Edema


Neurological: Alert and Oriented x 3, NL Muscle Strength and Tone


Lines/Tubes/Other Access: Clean, Dry and Intact Peripheral IV


Nutrition: Taking PO's


Result Diagrams: 


 01/29/17 05:33





 01/29/17 05:33





Assess/Plan/Problems-Billing


Assessment: 


Mrs. Engel is a 60yo F with PMH of PBC s/p liver transplant who presented 

with E. coli septicemia secondary to cholangitis.





- Patient Problems


(1) Sepsis


Comment: - Present on admission with fever and leukopenia.


- Source is cholangitis.


- Patient is imunossupressed and may not be able to mount a significant 

immunological response.   





(2) E. coli septicemia


Comment: - Source is likely cholangitis.


- Blood cultures still positive - repeat today.


- ID input appreciated.


- Continue Ceftriaxone and d/c IVF.   





(3) Cholangitis


Comment: - MRCP reviewed - shows CBD stenosis and gallstones. Likely source of 

her infection.


- Case d/w Dr. Vasquez who d/w [facility] Hepatologist - patient accepted 

in transfer for further evaluation, possible ERCP and stent placement.


- Awaiting bed availability.   





(4) Leukopenia


Comment: - Secondary to sepsis - trending up.   





(5) Thrombocytopenia


Comment: - Secondary to sepsis - resolved.   





(6) DVT prophylaxis


Comment: - Lovenox.   


Status and Disposition: 


Inpatient for management of E. coli septicemia. Awaiting bed at [facility].

## 2017-01-30 VITALS — SYSTOLIC BLOOD PRESSURE: 109 MMHG | DIASTOLIC BLOOD PRESSURE: 71 MMHG

## 2017-01-30 LAB
ALBUMIN SERPL BCG-MCNC: 3.6 G/DL (ref 3.2–5.2)
ALP SERPL-CCNC: 82 U/L (ref 34–104)
ALT SERPL W P-5'-P-CCNC: 15 U/L (ref 7–52)
ANION GAP SERPL CALC-SCNC: 6 MMOL/L (ref 2–11)
AST SERPL-CCNC: 13 U/L (ref 13–39)
BUN SERPL-MCNC: 6 MG/DL (ref 6–24)
BUN/CREAT SERPL: 10.5 (ref 8–20)
CALCIUM SERPL-MCNC: 8.9 MG/DL (ref 8.6–10.3)
CHLORIDE SERPL-SCNC: 101 MMOL/L (ref 101–111)
GLOBULIN SER CALC-MCNC: 2.9 G/DL (ref 2–4)
GLUCOSE SERPL-MCNC: 93 MG/DL (ref 70–100)
HCO3 SERPL-SCNC: 25 MMOL/L (ref 22–32)
HCT VFR BLD AUTO: 37 % (ref 35–47)
HGB BLD-MCNC: 12.6 G/DL (ref 12–16)
MCH RBC QN AUTO: 29 PG (ref 27–31)
MCHC RBC AUTO-ENTMCNC: 34 G/DL (ref 31–36)
MCV RBC AUTO: 87 FL (ref 80–97)
POTASSIUM SERPL-SCNC: 3.6 MMOL/L (ref 3.5–5)
PROT SERPL-MCNC: 6.5 G/DL (ref 6.4–8.9)
RBC # BLD AUTO: 4.28 10^6/UL (ref 4–5.4)
SODIUM SERPL-SCNC: 132 MMOL/L (ref 133–145)
WBC # BLD AUTO: 3.8 10^3/UL (ref 3.5–10.8)

## 2017-01-30 RX ADMIN — ENOXAPARIN SODIUM SCH: 40 INJECTION SUBCUTANEOUS at 13:38

## 2017-01-30 RX ADMIN — URSODIOL SCH MG: 250 TABLET, FILM COATED ORAL at 18:09

## 2017-01-30 RX ADMIN — TACROLIMUS SCH MG: 1 CAPSULE ORAL at 07:51

## 2017-01-30 RX ADMIN — MYCOPHENOLIC ACID SCH MG: 360 TABLET, DELAYED RELEASE ORAL at 22:20

## 2017-01-30 RX ADMIN — TACROLIMUS SCH MG: 1 CAPSULE ORAL at 22:20

## 2017-01-30 RX ADMIN — URSODIOL SCH MG: 250 TABLET, FILM COATED ORAL at 07:51

## 2017-01-30 RX ADMIN — CEFTRIAXONE SODIUM SCH MLS/HR: 1 INJECTION, POWDER, FOR SOLUTION INTRAMUSCULAR; INTRAVENOUS at 07:46

## 2017-01-30 RX ADMIN — MYCOPHENOLIC ACID SCH MG: 360 TABLET, DELAYED RELEASE ORAL at 07:51

## 2017-01-30 NOTE — PN
Subjective


Date of Service: 01/30/17


Interval History: 


HOSPITALIST PROGRESS NOTE


Patient seen and examined at bedside.


She feels well, offers no complaints. Anxious about transfer.


Family History: Unchanged from Admission


Social History: Unchanged from Admission


Past Medical History: Unchanged from Admission





Objective


Active Medications: 








Acetaminophen (Tylenol Tab*)  650 mg PO Q4H PRN


   PRN Reason: FEVER/PAIN


   Last Admin: 01/28/17 15:22 Dose:  650 mg


Enoxaparin Sodium (Lovenox(*))  40 mg SUBCUT Q24H [facility]


   Last Admin: 01/30/17 13:38 Dose:  Not Given


Ceftriaxone Sodium 1,000 mg/ (Sodium Chloride)  50 mls @ 200 mls/hr IVPB Q24H 

[facility]


   Last Admin: 01/30/17 07:46 Dose:  200 mls/hr


Morphine Sulfate (Morphine Inj (Syringe)*)  2 mg IV Q4H PRN


   PRN Reason: PAIN


Mycophenolate Sodium (Myfortic (Nf))  360 mg PO BID [facility]


   Last Admin: 01/30/17 07:51 Dose:  360 mg


Tacrolimus (Prograf Cap(*))  2 mg PO BID [facility]


   Last Admin: 01/30/17 07:51 Dose:  2 mg


Ursodiol (Yonathan 250(Nf))  250 mg PO QPM [facility]


   Last Admin: 01/29/17 17:38 Dose:  250 mg


Ursodiol (Yonathan 250(Nf))  500 mg PO QAM [facility]


   Last Admin: 01/30/17 07:51 Dose:  500 mg








 Vital Signs











  01/29/17 01/30/17 01/30/17





  20:00 00:01 07:53


 


Temperature  98.1 F 97.3 F


 


Pulse Rate  68 77


 


Respiratory 16 19 





Rate   


 


Blood Pressure  111/63 111/77





(mmHg)   


 


O2 Sat by Pulse 98 99 100





Oximetry   








Oxygen Devices in Use Now: None


Appearance: Pleasant lady sitting up in bed in NAD.


Eyes: No Scleral Icterus


Ears/Nose/Mouth/Throat: Mucous Membranes Moist, - - No jaundice


Neck: Trachea Midline


Neurological: Alert and Oriented x 3, NL Muscle Strength and Tone


Lines/Tubes/Other Access: Clean, Dry and Intact Peripheral IV


Nutrition: Taking PO's


Result Diagrams: 


 01/30/17 05:31





 01/30/17 05:31





Assess/Plan/Problems-Billing


Assessment: 


Mrs. Wejnert is a 62yo F with PMH of PBC s/p liver transplant who presented 

with E. coli septicemia secondary to cholangitis.





- Patient Problems


(1) Sepsis


Comment: - Present on admission with fever and leukopenia.


- Source is cholangitis.


- Patient is imunossupressed and may not be able to mount a significant 

immunological response.   





(2) E. coli septicemia


Comment: - Source is likely cholangitis.


- Follow up blood cultures show no growth so far.


- ID input appreciated.


- Continue Ceftriaxone.   





(3) Cholangitis


Comment: - MRCP reviewed - shows CBD stenosis and gallstones. Likely source of 

her infection.


- Case d/w Dr. Vasquez who d/w [facility] Hepatologist - patient accepted 

in transfer for further evaluation, possible ERCP and stent placement.


- Awaiting bed availability.   





(4) Leukopenia


Comment: - Secondary to sepsis - resolved.   





(5) Thrombocytopenia


Comment: - Secondary to sepsis - resolved.   





(6) DVT prophylaxis


Comment: - Lovenox.   


Status and Disposition: 


Inpatient for management of E. coli septicemia. Awaiting bed at [facility].

## 2017-02-02 NOTE — CONS
CONSULTATION REPORT:

 

DATE OF CONSULT:  01/27/17

 

INDICATION:  Cholangitis.

 

TIME SPENT:  Total time of consultation greater than 75 minutes.

 

NARRATIVE:  Ms. Engel is a 61-year-old female who underwent a liver 
transplantation at [facility] approximately 6 years ago for 
primary biliary cirrhosis.  She states she had been doing well up until a few 
days ago; she developed abdominal pain, fevers, chills, and rigors.  She denies 
any new medications and no sick contacts.  She came to the emergency room.  A 
few days earlier, her primary care physician had drawn blood cultures on her 
and they came back positive with Gram-negative rods.  In the emergency room, 
she did have a CT of abdomen and pelvis.  Please see the report below.  
Currently, she is feeling better.

 

PAST MEDICAL HISTORY:  Primary biliary  cirrhosis.

 

PAST SURGICAL HISTORY:  Liver transplant.

 

MEDICATIONS:  On admission include:

1.  CellCept.

2.  Mycophenolate 360 mg p.o. b.i.d.

3.  Tacrolimus 2 mg twice a day.

4.  Ursodiol 500 mg in the morning and 250 mg at night.

5.  Calcium carbonate.

 

SOCIAL HISTORY:  No tobacco or alcohol.

 

REVIEW OF SYSTEMS:  Twelve systems were reviewed; other than the mentioned in 
the HPI are unremarkable.

 

PHYSICAL EXAM:  Vital signs:  Temperature is 97.8, blood pressure 110/58, and 
pulse is 68.  General:  A well-appearing female, in no apparent distress.  Alert
, oriented, pleasant, and fluent.  HEENT:  Mucous membranes are moist without 
lesions, ulcers, or exudate.  Neck is supple.  Trachea is midline.  Head is 
normocephalic, atraumatic.  Lungs:  Clear to auscultation.  Heart:  Regular 
rate and rhythm.  Abdomen:  Positive bowel sounds.  Soft, nontender, and 
nondistended. No hepatosplenomegaly, masses, rebound, or guarding. A well-
healed chevron scar. Skin: Warm and dry.

 

DIAGNOSTIC STUDIES/LAB DATA:  Microbiology Data:  She had blood cultures from 
26th, which show E. coli.

 

Laboratory data:  White count of 3.6, hemoglobin 10.8, and platelet count of 
114. INR is 120.  Potassium is 3.4, sodium is normal at 134, and BUN is 7.  
LFTs are normal.

 

She had a CT abdomen and pelvis yesterday, which revealed intrahepatic and 
extrahepatic biliary ductal dilatation.  She then had an MRI also last night, 
which showed a stricture in the common bile duct, presumed to be anastomosis 
between the common duct of the transplanted liver and the native common bile 
duct filling defect proximal to the stricture.  These are likely common duct 
stones.

 

ASSESSMENT AND PLAN:  A 61-year-old female with cholangitis who is now 
asymptomatic with the cholangitis likely secondary to a biliary ductal 
stricture and stones.  At this point, she does need an ERCP.  The patient has 
requested transfer to [facility].  I think that this is 
reasonable.  I did spend approximately 75 minutes in discussion with the 
patient and three of her physicians at [facility].  I did speak with 
 ______ at area code [phone].  I then spoke to a transplant surgery 
fellow, her name is Dr. Alee Geronimo [phone].

 

TIME SPENT:  Again greater than 75 minutes was spent in coordinating the 
patient care with at least 50 minutes in direct patient visualization.

 

CC:  [facility]*



 [id number]/CPS #: [id number]

MAHENDRA

## 2017-05-20 ENCOUNTER — HOSPITAL ENCOUNTER (EMERGENCY)
Dept: HOSPITAL 25 - UCEAST | Age: 62
Discharge: ANOTHER HOSPITAL | End: 2017-05-20
Payer: COMMERCIAL

## 2017-05-20 ENCOUNTER — HOSPITAL ENCOUNTER (EMERGENCY)
Dept: HOSPITAL 25 - ED | Age: 62
Discharge: HOME | End: 2017-05-20
Payer: COMMERCIAL

## 2017-05-20 VITALS — SYSTOLIC BLOOD PRESSURE: 122 MMHG | DIASTOLIC BLOOD PRESSURE: 85 MMHG

## 2017-05-20 VITALS — DIASTOLIC BLOOD PRESSURE: 83 MMHG | SYSTOLIC BLOOD PRESSURE: 122 MMHG

## 2017-05-20 DIAGNOSIS — R53.1: ICD-10-CM

## 2017-05-20 DIAGNOSIS — Z94.4: ICD-10-CM

## 2017-05-20 DIAGNOSIS — R61: ICD-10-CM

## 2017-05-20 DIAGNOSIS — R05: ICD-10-CM

## 2017-05-20 DIAGNOSIS — R10.11: Primary | ICD-10-CM

## 2017-05-20 LAB
ALBUMIN SERPL BCG-MCNC: 3.9 G/DL (ref 3.2–5.2)
ALP SERPL-CCNC: 141 U/L (ref 34–104)
ALT SERPL W P-5'-P-CCNC: 32 U/L (ref 7–52)
ANION GAP SERPL CALC-SCNC: 7 MMOL/L (ref 2–11)
AST SERPL-CCNC: 37 U/L (ref 13–39)
BUN SERPL-MCNC: 8 MG/DL (ref 6–24)
BUN/CREAT SERPL: 14.3 (ref 8–20)
CALCIUM SERPL-MCNC: 9.1 MG/DL (ref 8.6–10.3)
CHLORIDE SERPL-SCNC: 100 MMOL/L (ref 101–111)
GLOBULIN SER CALC-MCNC: 3.2 G/DL (ref 2–4)
GLUCOSE SERPL-MCNC: 85 MG/DL (ref 70–100)
HCO3 SERPL-SCNC: 25 MMOL/L (ref 22–32)
HCT VFR BLD AUTO: 40 % (ref 35–47)
HGB BLD-MCNC: 13.4 G/DL (ref 12–16)
LIPASE SERPL-CCNC: 23 U/L (ref 11–82)
MCH RBC QN AUTO: 29 PG (ref 27–31)
MCHC RBC AUTO-ENTMCNC: 33 G/DL (ref 31–36)
MCV RBC AUTO: 87 FL (ref 80–97)
POTASSIUM SERPL-SCNC: 3.5 MMOL/L (ref 3.5–5)
PROT SERPL-MCNC: 7.1 G/DL (ref 6.4–8.9)
RBC # BLD AUTO: 4.67 10^6/UL (ref 4–5.4)
SODIUM SERPL-SCNC: 132 MMOL/L (ref 133–145)
WBC # BLD AUTO: 2.9 10^3/UL (ref 3.5–10.8)

## 2017-05-20 PROCEDURE — 85610 PROTHROMBIN TIME: CPT

## 2017-05-20 PROCEDURE — 87040 BLOOD CULTURE FOR BACTERIA: CPT

## 2017-05-20 PROCEDURE — 83690 ASSAY OF LIPASE: CPT

## 2017-05-20 PROCEDURE — 81003 URINALYSIS AUTO W/O SCOPE: CPT

## 2017-05-20 PROCEDURE — 99212 OFFICE O/P EST SF 10 MIN: CPT

## 2017-05-20 PROCEDURE — G0463 HOSPITAL OUTPT CLINIC VISIT: HCPCS

## 2017-05-20 PROCEDURE — 85025 COMPLETE CBC W/AUTO DIFF WBC: CPT

## 2017-05-20 PROCEDURE — 36415 COLL VENOUS BLD VENIPUNCTURE: CPT

## 2017-05-20 PROCEDURE — 80053 COMPREHEN METABOLIC PANEL: CPT

## 2017-05-20 PROCEDURE — 99282 EMERGENCY DEPT VISIT SF MDM: CPT

## 2017-05-20 PROCEDURE — 86140 C-REACTIVE PROTEIN: CPT

## 2017-05-20 PROCEDURE — 83605 ASSAY OF LACTIC ACID: CPT

## 2017-05-20 NOTE — ED
Cira GAO Erika, scribed for Umair Santana MD on 05/20/17 at 1441 .





Abdominal Pain/Female





- HPI Summary


HPI Summary: 


Patient is a 62-year-old female presenting to the ED with a CC of RUQ pain. 

Patient reports she developed dull, intermittent RUQ pain about 1 week ago, 

which has gradually been worsening. Last night, patient had a 1 hour period of 

severe, constant RUQ pain with associated nausea and chills. This pain had an 

onset 30 minutes - 1 hour after she ate a light, bland meal. Pain is still 

present intermittently, and patient did not eat today because she was worried 

this would bring on the pain. She currently denies nausea. Pt denies any 

urinary symptoms. Patient reports she had a liver transplant 6 years ago, and 

had no major problems until January 2017. In January, pt was admitted for 

septic shock with symptoms of fever and chills. She needed a stent as MRI 

showed bile duct obstruction, so pt had a stent placed at [facility]. Pt had the stent removed in April. She is concerned that her 

current symptoms are similar to those she had in January.





- History of Current Complaint


Chief Complaint: EDAbdPain


Stated Complaint: ABD PAIN/LIVER


Time Seen by Provider: 05/20/17 14:06


Hx Obtained From: Patient


Hx Last Menstrual Period: post menopausal


Onset/Duration: Gradual Onset, Lasting Weeks - 1 week, Still Present


Timing: Intermittent Episode Lasting - minutes


Severity Currently: Moderate


Pain Intensity: 8


Pain Scale Used: 0-10 Numeric


Location: Discrete At: RUQ


Character: Dull


Alleviating Factor(s): Spontaneous Resolution


Associated Signs and Symptoms: Positive: Nausea, Other: - chills.  Negative: 

Urinary Symptoms


Allergies/Adverse Reactions: 


 Allergies











Allergy/AdvReac Type Severity Reaction Status Date / Time


 


No Known Allergies Allergy   Verified 03/30/13 12:59














PMH/Surg Hx/FS Hx/Imm Hx


Cardiovascular History: 


   Denies: Hx Pacemaker/ICD


Respiratory History: 


   Denies: Hx Asthma, Hx Chronic Obstructive Pulmonary Disease (COPD)


GI History: Reports: Hx Cirrhosis - Primary biliary cirrhosis, liver transplant

, Other GI Disorders - Liver transplant 6 years ago


Musculoskeletal History: 


   Denies: Hx Osteoporosis


Sensory History: 


   Denies: Hx Hearing Aid


Psychiatric History: 


   Denies: Hx Panic Disorder





- Surgical History


Surgery Procedure, Year, and Place: liver transplant 6 years ago.  LEFT BREAST 

IMPLANT


Hx Anesthesia Reactions: No


Infectious Disease History: No


Infectious Disease History: 


   Denies: Traveled Outside the [place] in Last 30 Days





- Family History


Known Family History: 


   Negative: Cardiac Disease, Hypertension, Diabetes





- Social History


Alcohol Use: None


Hx Substance Use: No


Substance Use Type: Reports: None


Hx Tobacco Use: No


Smoking Status (MU): Never Smoked Tobacco





Review of Systems


Positive: Chills


Positive: Abdominal Pain, Nausea


Negative: dysuria


All Other Systems Reviewed And Are Negative: Yes





Physical Exam


Triage Information Reviewed: Yes


Vital Signs On Initial Exam: 


 Initial Vitals











Temp Pulse Resp BP Pulse Ox


 


 98.3 F   80   16   112/82   100 


 


 05/20/17 13:50  05/20/17 13:50  05/20/17 13:50  05/20/17 13:50  05/20/17 13:50











Vital Signs Reviewed: Yes


Appearance: Positive: Well-Appearing, No Pain Distress


Skin: Positive: Warm, Skin Color Reflects Adequate Perfusion, Dry


Head/Face: Positive: Normal Head/Face Inspection


Eyes: Positive: Normal, Other: - No jaundice, no icterus


ENT: Positive: Normal ENT inspection


Neck: Positive: Supple, Nontender


Respiratory/Lung Sounds: Positive: Clear to Auscultation, Breath Sounds Present


Cardiovascular: Positive: RRR


Abdomen Description: Positive: Soft, Other: - Tender RUQ


Bowel Sounds: Positive: Present


Musculoskeletal: Positive: Normal


Neurological: Positive: Normal


Psychiatric: Positive: Affect/Mood Appropriate





Diagnostics





- Vital Signs


 Vital Signs











  Temp Pulse Resp BP Pulse Ox


 


 05/20/17 13:50  98.3 F  80  16  112/82  100














- Laboratory


Lab Results: 


 Lab Results











  05/20/17 05/20/17 05/20/17 Range/Units





  14:30 14:55 14:55 


 


WBC   2.9 L   (3.5-10.8)  10^3/ul


 


RBC   4.67   (4.0-5.4)  10^6/ul


 


Hgb   13.4   (12.0-16.0)  g/dl


 


Hct   40   (35-47)  %


 


MCV   87   (80-97)  fL


 


MCH   29   (27-31)  pg


 


MCHC   33   (31-36)  g/dl


 


RDW   13   (10.5-15)  %


 


Plt Count   157   (150-450)  10^3/ul


 


MPV   8   (7.4-10.4)  um3


 


Neut % (Auto)   49.8   (38-83)  %


 


Lymph % (Auto)   34.3   (25-47)  %


 


Mono % (Auto)   13.1 H   (1-9)  %


 


Eos % (Auto)   1.2   (0-6)  %


 


Baso % (Auto)   1.6   (0-2)  %


 


Absolute Neuts (auto)   1.4 L   (1.5-7.7)  10^3/ul


 


Absolute Lymphs (auto)   1.0   (1.0-4.8)  10^3/ul


 


Absolute Monos (auto)   0.4   (0-0.8)  10^3/ul


 


Absolute Eos (auto)   0   (0-0.6)  10^3/ul


 


Absolute Basos (auto)   0   (0-0.2)  10^3/ul


 


Absolute Nucleated RBC   0.01   10^3/ul


 


Nucleated RBC %   0.2   


 


INR (Anticoag Therapy)    1.06  (0.89-1.11)  


 


Sodium     (133-145)  mmol/L


 


Potassium     (3.5-5.0)  mmol/L


 


Chloride     (101-111)  mmol/L


 


Carbon Dioxide     (22-32)  mmol/L


 


Anion Gap     (2-11)  mmol/L


 


BUN     (6-24)  mg/dL


 


Creatinine     (0.51-0.95)  mg/dL


 


Est GFR ( Amer)     (>60)  


 


Est GFR (Non-Af Amer)     (>60)  


 


BUN/Creatinine Ratio     (8-20)  


 


Glucose     ()  mg/dL


 


Lactic Acid     (0.5-2.0)  mmol/L


 


Calcium     (8.6-10.3)  mg/dL


 


Total Bilirubin     (0.2-1.0)  mg/dL


 


AST     (13-39)  U/L


 


ALT     (7-52)  U/L


 


Alkaline Phosphatase     ()  U/L


 


C-Reactive Protein     (< 5.00)  mg/L


 


Total Protein     (6.4-8.9)  g/dL


 


Albumin     (3.2-5.2)  g/dL


 


Globulin     (2-4)  g/dL


 


Albumin/Globulin Ratio     (1-3)  


 


Lipase     (11.0-82.0)  U/L


 


Urine Color  Yellow    


 


Urine Appearance  Clear    


 


Urine pH  6.0    (5-9)  


 


Ur Specific Gravity  1.014    (1.010-1.030)  


 


Urine Protein  Negative    (Negative)  


 


Urine Ketones  1+ H    (Negative)  


 


Urine Blood  Negative    (Negative)  


 


Urine Nitrate  Negative    (Negative)  


 


Urine Bilirubin  Negative    (Negative)  


 


Urine Urobilinogen  Negative    (Negative)  


 


Ur Leukocyte Esterase  Negative    (Negative)  


 


Urine Glucose  Negative    (Negative)  


 


Urine Ascorbic Acid  * H    (Negative)  














  05/20/17 05/20/17 Range/Units





  14:55 14:55 


 


WBC    (3.5-10.8)  10^3/ul


 


RBC    (4.0-5.4)  10^6/ul


 


Hgb    (12.0-16.0)  g/dl


 


Hct    (35-47)  %


 


MCV    (80-97)  fL


 


MCH    (27-31)  pg


 


MCHC    (31-36)  g/dl


 


RDW    (10.5-15)  %


 


Plt Count    (150-450)  10^3/ul


 


MPV    (7.4-10.4)  um3


 


Neut % (Auto)    (38-83)  %


 


Lymph % (Auto)    (25-47)  %


 


Mono % (Auto)    (1-9)  %


 


Eos % (Auto)    (0-6)  %


 


Baso % (Auto)    (0-2)  %


 


Absolute Neuts (auto)    (1.5-7.7)  10^3/ul


 


Absolute Lymphs (auto)    (1.0-4.8)  10^3/ul


 


Absolute Monos (auto)    (0-0.8)  10^3/ul


 


Absolute Eos (auto)    (0-0.6)  10^3/ul


 


Absolute Basos (auto)    (0-0.2)  10^3/ul


 


Absolute Nucleated RBC    10^3/ul


 


Nucleated RBC %    


 


INR (Anticoag Therapy)    (0.89-1.11)  


 


Sodium  132 L   (133-145)  mmol/L


 


Potassium  3.5   (3.5-5.0)  mmol/L


 


Chloride  100 L   (101-111)  mmol/L


 


Carbon Dioxide  25   (22-32)  mmol/L


 


Anion Gap  7   (2-11)  mmol/L


 


BUN  8   (6-24)  mg/dL


 


Creatinine  0.56   (0.51-0.95)  mg/dL


 


Est GFR ( Amer)  141.5   (>60)  


 


Est GFR (Non-Af Amer)  110.1   (>60)  


 


BUN/Creatinine Ratio  14.3   (8-20)  


 


Glucose  85   ()  mg/dL


 


Lactic Acid   0.6  (0.5-2.0)  mmol/L


 


Calcium  9.1   (8.6-10.3)  mg/dL


 


Total Bilirubin  0.80   (0.2-1.0)  mg/dL


 


AST  37   (13-39)  U/L


 


ALT  32   (7-52)  U/L


 


Alkaline Phosphatase  141 H   ()  U/L


 


C-Reactive Protein  10.28 H   (< 5.00)  mg/L


 


Total Protein  7.1   (6.4-8.9)  g/dL


 


Albumin  3.9   (3.2-5.2)  g/dL


 


Globulin  3.2   (2-4)  g/dL


 


Albumin/Globulin Ratio  1.2   (1-3)  


 


Lipase  23   (11.0-82.0)  U/L


 


Urine Color    


 


Urine Appearance    


 


Urine pH    (5-9)  


 


Ur Specific Gravity    (1.010-1.030)  


 


Urine Protein    (Negative)  


 


Urine Ketones    (Negative)  


 


Urine Blood    (Negative)  


 


Urine Nitrate    (Negative)  


 


Urine Bilirubin    (Negative)  


 


Urine Urobilinogen    (Negative)  


 


Ur Leukocyte Esterase    (Negative)  


 


Urine Glucose    (Negative)  


 


Urine Ascorbic Acid    (Negative)  











Result Diagrams: 


 05/20/17 14:55





 05/20/17 14:55


Lab Statement: Any lab studies that have been ordered have been reviewed, and 

results considered in the medical decision making process.





Re-Evaluation





- Re-Evaluation


  ** First Eval


Re-Evaluation Time: 16:43


Comment: Discussed lab results with patient





  ** Second Eval


Re-Evaluation Time: 17:01


Comment: Discussed Dr. Devine's recommendations with patient





Abdominal Pain Fem Course/Dx





- Course


Course Of Treatment: Ms.. Engel had a liver transplant about 6 years ago and 

then got septic about 5-6 months ago and required a stent in her liver.  She 

has been having a few days of RUQ pain and felling chills intermittently.  She 

was tender in the RUQ.  Her labs are OK.  She is leukopenic consistent with 

previous.  He alkaline phosphatase is very slightly elevated.  I discussed it 

with Dr. Devine.  She will need an MRCP but not necessarily on an emergent 

basis.  There is no sign of infection now but, of course, she can get sick very 

fast as she is immune suppressed.  She is aware of this and wants to go homw 

and contact her doctors on Monday morning.  She will return if she gets a fever 

or feels worse.





- Diagnoses


Provider Diagnoses: 


 Abdominal pain








- Provider Notifications


Discussed Care Of Patient With: Dr. Devine (GI) at 16:58 - believes MRCP is 

not indicated and emergent





Discharge





- Discharge Plan


Condition: Stable


Disposition: HOME


Patient Education Materials:  Abdominal Pain (ED)


Referrals: 


Hank Bravo MD [Primary Care Provider] - 


Additional Instructions: 


Please follow up with your doctor at [facility] on Monday.





The documentation as recorded by the Cira bazan Erika accurately reflects 

the service I personally performed and the decisions made by me, Umair Santana MD.

## 2017-05-22 NOTE — UC
Vale GAO Auryana, [facility] for [facility] on 05/20/17 at 1242 .





Back Pain HPI





- HPI Summary


HPI Summary: 





IN ROOM NOTE: 


62 year old female comes in with intermittent RUQ abdominal pain starting 1 

week ago worse since yesterday - more intense and frequent. She reports that 

her severe episode started at 7pm and lasted 1 hour last night. She also had 

weakness (starting yesterday), chills (new onset), diaphoretic, cough - "dog 

cough" before transplant - reports same as before. She denies any fever and 

nausea.


She is concerned that she may have a bacteremia again - same pain but is not as 

bad as the pain that started her episode in January-worried it may worsen to 

become gram negative bacteremia. With previous episode - reports that she 

always had normal liver enzymes. PMHx is significant for biliary cirrhosis with 

stent 1/2017 (Removed 4/2017 due to inflammation). FHx is significant for 

biliary cirrhosis - most female in her family. SHx is not significant for 

tobacco or alcohol use. She is currently a professor at the [employer]. She lives with her  in [place]. Her PCP is Dr. Bravo - called 

this morning and advised to go to ED.





MD NOTE:


Vital signs stable, afebrile, pulse ox 100. No alcohol or tobacco. History of 

biliary cirrhosis and liver transplant 6 years ago. Patient concerned about re-

newed infection. Gram negative bacteremia January 2017.








NURSE NOTE:


pt had a liver transplant in  2011 and has had minor problems until the 

beginning of this year. she had a stent placed end of January because she had 

bacteremia infection in the blood and was hospitalized. all has been fine until 

the last couple of days. last night the pain was severe for about 30 minutes in 

her right flank with some across the abdomen occasionally.











 








- History of Current Complaint


Chief Complaint: UCUpperExtremity


Stated Complaint: FEVER,BACK PAIN -PRIOR LIVER TRNSPLNT


Time Seen by Provider: 05/20/17 12:22


Hx Obtained From: Patient


Hx Last Menstrual Period: post menopausal


Pregnant?: No


Onset/Duration: Gradual Onset, Lasting Weeks - 1, Still Present


Timing: Constant


Severity Initially: Mild


Severity Currently: Moderate


Back Pain: Is Discrete @ - ruq


Associated Signs And Symptoms: Positive: Weakness, Abdominal Pain, Other - COUGH

, CHILLS, DIAPHORETIC


Related History: Similar Episode Dx As - SEE HPI





- Allergies/Home Medications


Allergies/Adverse Reactions: 


 Allergies











Allergy/AdvReac Type Severity Reaction Status Date / Time


 


No Known Allergies Allergy   Verified 03/30/13 12:59














PMH/Surg Hx/FS Hx/Imm Hx


Cardiovascular History Of: 


   Denies: Pacemaker/ICD


Respiratory History Of: 


   Denies: COPD, Asthma





- Surgical History


Surgical History: Yes


Surgery Procedure, Year, and Place: liver transplant 6 years ago.  LEFT BREAST 

IMPLANT





- Family History


Known Family History: 


   Negative: Cardiac Disease, Hypertension, Diabetes





- Social History


Occupation: Employed Full-time


Lives: With Family


Alcohol Use: None


Substance Use Type: None


Smoking Status (MU): Never Smoked Tobacco





- Immunization History


Most Recent Influenza Vaccination: "a while ago"


Most Recent Tetanus Shot: up to date


Most Recent Pneumonia Vaccination: Never





Review of Systems


Constitutional: Chills, Other - DIAPHORETIC


Skin: Negative


Eyes: Negative


ENT: Negative


Respiratory: Cough


Cardiovascular: Negative


Gastrointestinal: Abdominal Pain


Genitourinary: Negative


Motor: Weakness


Neurovascular: Negative


Musculoskeletal: Negative


Neurological: Negative


Psychological: Negative


All Other Systems Reviewed And Are Negative: Yes





Physical Exam


Triage Information Reviewed: Yes


Appearance: Well-Appearing, No Pain Distress, Well-Nourished


Vital Signs: 


 Initial Vital Signs











Temp  98.1 F   05/20/17 11:39


 


Pulse  74   05/20/17 11:39


 


Resp  16   05/20/17 11:39


 


BP  122/85   05/20/17 11:39


 


Pulse Ox  100   05/20/17 11:39











Vital Signs Reviewed: Yes


Eyes: Positive: Conjunctiva Clear


ENT: Positive: Hearing grossly normal, Pharynx normal, TMs normal


Neck: Positive: Supple, Nontender, No Lymphadenopathy


Respiratory: Positive: Chest non-tender, Lungs clear, Normal breath sounds, No 

respiratory distress


Cardiovascular: Positive: RRR, No Murmur


Abdomen Description: Positive: No Organomegaly, Soft, Other: - MILD RUQ 

TENDERNESS; NO PERITONEAL SIGNS.  Negative: Peritoneal Signs


Bowel Sounds: Positive: Absent -  NO BOWEL SOUNDS PRESENT


Musculoskeletal: Positive: Strength Intact, Other: - GRAF


Neurological: Positive: Alert


Psychological: Positive: Age Appropriate Behavior


Skin: Negative: rashes





Back Pain Course/Dx





- Course


Course Of Treatment: Extensively discussed patient's condition. In January, she 

had gram negative bacteremia, admitted to [facility] and was sent to [facility] for a stent-procedure was successful. It is possible that 

she is beginning to obstruct her bile because her stent was removed in April 2017. Discussed patient with ED PANancie (13:10) - agrees to accept for 

further workup - MRI and labs. Patient will be discharged AMA and will go to ED 

by private car.  Patient is Urgent/Emergent. BP elevated due to current 

condition w/o HTN in PMH.  Medications have been included in the original chart 

and reviewed.





- Differential Dx/Diagnosis


Provider Diagnoses: RUQ PAIN OF UNCLEAR ETIOLOGY





Discharge





- Discharge Plan


Condition: Stable


Disposition: TRANS HIGHER LVL OF CARE FAC


Referrals: 


Hank Bravo MD [Primary Care Provider] - 


Additional Instructions: 


Thank you for helping us improve patient care by filling out the [employer] Survey.





AS WE DISCUSSED, GO TO ED NOW FOR FURTHER EVALUATION AND TREATMENT, AS NEEDED, 

FOR YOU ABDOMINAL DISCOMFORT.





The documentation as recorded by the Vale bazan Auryana accurately 

reflects the service I personally performed and the decisions made by me, 

Woody Vanessa MD.

## 2019-04-30 ENCOUNTER — HOSPITAL ENCOUNTER (EMERGENCY)
Dept: HOSPITAL 25 - UCEAST | Age: 64
Discharge: HOME | End: 2019-04-30
Payer: COMMERCIAL

## 2019-04-30 VITALS — SYSTOLIC BLOOD PRESSURE: 108 MMHG | DIASTOLIC BLOOD PRESSURE: 70 MMHG

## 2019-04-30 DIAGNOSIS — R50.9: ICD-10-CM

## 2019-04-30 DIAGNOSIS — Z94.4: ICD-10-CM

## 2019-04-30 DIAGNOSIS — J32.9: Primary | ICD-10-CM

## 2019-04-30 PROCEDURE — G0463 HOSPITAL OUTPT CLINIC VISIT: HCPCS

## 2019-04-30 PROCEDURE — 99212 OFFICE O/P EST SF 10 MIN: CPT

## 2019-04-30 NOTE — UC
General HPI





- HPI Summary


HPI Summary: 





Patient with 1 week of symptoms.  Initially started with a sore throat, then 

developed into congestion and body aches.  For the past 2 days, low grade temp, 

Frontal and sinus headache, pain over her eyes with significant congestion.  

Tmax 100 over the past two days.  Is afraid to take any medication due to her 

liver transplant on immunosuppressives.  SHe is travelling to [place] next week 

and is worried.  Meds: reviewed 





- History of Current Complaint


Chief Complaint: UCRespiratory


Stated Complaint: SINUS ISSUE


Time Seen by Provider: 04/30/19 13:05


Hx Last Menstrual Period: post menopausal


Pain Intensity: 7





- Allergy/Home Medications


Allergies/Adverse Reactions: 


 Allergies











Allergy/AdvReac Type Severity Reaction Status Date / Time


 


No Known Allergies Allergy   Verified 04/30/19 12:59











Home Medications: 


 Home Medications





Tacrolimus CAP(*) [Prograf CAP(*)] 1 mg PO BEDTIME 04/30/19 [History Confirmed 

04/30/19]











PMH/Surg Hx/FS Hx/Imm Hx


Previously Healthy: No


GI/ History: Other - Hx of liver transplant, on immunosuppressives





- Surgical History


Surgical History: Yes


Surgery Procedure, Year, and Place: HX OF LIVER TRANSPLANT





- Family History


Known Family History: 


   Negative: Cardiac Disease, Hypertension, Diabetes





- Social History


Alcohol Use: None


Substance Use Type: None


Smoking Status (MU): Never Smoked Tobacco





- Immunization History


Most Recent Influenza Vaccination: "a while ago"


Most Recent Tetanus Shot: up to date


Most Recent Pneumonia Vaccination: Never





Review of Systems


All Other Systems Reviewed And Are Negative: Yes


Constitutional: Positive: Fever


ENT: Positive: Nasal Discharge, Sinus Congestion


Is Patient Immunocompromised?: Yes





Physical Exam


Triage Information Reviewed: Yes


Appearance: Well-Appearing


Vital Signs: 


 Initial Vital Signs











Temp  99.1 F   04/30/19 12:56


 


Pulse  78   04/30/19 12:56


 


Resp  18   04/30/19 12:56


 


BP  108/70   04/30/19 12:56


 


Pulse Ox  99   04/30/19 12:56











Vital Signs Reviewed: Yes


ENT: Positive: Nasal congestion, Nasal drainage, Sinus tenderness, Other - TM's 

b/l dull - mild erythema in left TM


Neck: Positive: Supple, Nontender


Respiratory: Positive: Lungs clear, Normal breath sounds


Cardiovascular: Positive: RRR, No Murmur


Skin Exam: Normal





Course/Dx





- Course


Course Of Treatment: 





This is a 64 yr old with PMHx of liver transplant on immunosuppressives 

presents with 1 week of congestions, sinus pain/tenderness and low grade temp 








Assessment 


Due to her immunocompromised status, would treat with Abx 








Plan


Start Augmentin 875 mg 2x/day for 7 days 


Can do a trial of decongestant such as sudafed as well for congestion 


Can also use ibuprofen as needed as directed short term for pain/fever 





Is symptoms persist or worsen despite treatment, recommend follow up for 

further evaluation 





- Diagnoses


Provider Diagnosis: 


 Sinusitis








Discharge





- Sign-Out/Discharge


Documenting (check all that apply): Patient Departure


All imaging exams completed and their final reports reviewed: No Studies





- Discharge Plan


Condition: Fair


Disposition: HOME


Prescriptions: 


Amoxicillin/Clavulanate TAB* [Augmentin *] 875 mg PO BID #14 tab


Patient Education Materials:  Sinusitis (ED)


Referrals: 


Maynor Madsen, NP [Primary Care Provider] - 


Additional Instructions: 


Start Augmentin 875 mg 2x/day for 7 days 


Can do a trial of decongestant such as sudafed as well for congestion 


Can also use ibuprofen as needed as directed short term for pain/fever 





Is symptoms persist or worsen despite treatment, recommend follow up for 

further evaluation 





- Billing Disposition and Condition


Condition: FAIR


Disposition: Home